# Patient Record
Sex: FEMALE | Race: BLACK OR AFRICAN AMERICAN | NOT HISPANIC OR LATINO | Employment: OTHER | URBAN - METROPOLITAN AREA
[De-identification: names, ages, dates, MRNs, and addresses within clinical notes are randomized per-mention and may not be internally consistent; named-entity substitution may affect disease eponyms.]

---

## 2017-01-26 ENCOUNTER — CLINICAL SUPPORT (OUTPATIENT)
Dept: SMOKING CESSATION | Facility: CLINIC | Age: 77
End: 2017-01-26
Payer: COMMERCIAL

## 2017-01-26 DIAGNOSIS — F17.200 NICOTINE DEPENDENCE: Primary | ICD-10-CM

## 2017-01-26 PROCEDURE — 99404 PREV MED CNSL INDIV APPRX 60: CPT | Mod: S$GLB,,,

## 2017-01-26 RX ORDER — IBUPROFEN 200 MG
1 TABLET ORAL DAILY
Qty: 14 PATCH | Refills: 0 | Status: SHIPPED | OUTPATIENT
Start: 2017-01-26 | End: 2017-02-06 | Stop reason: SDUPTHER

## 2017-01-26 NOTE — Clinical Note
Pt seen at intake today. She currently smokes 20 cigs/day. Discussed tobacco cessation medications of 21 mg nicotine patch QD  Pt started on rate reduction and wait time of 15 min prior to smoking. Will see pt back in office in 1 week. Pt's exhaled carbon monoxide level was 24 ppm as per Smokerlyzer.  Please see Tobacco Cessation Intake Form for patient assessment and recommendations.

## 2017-02-01 ENCOUNTER — CLINICAL SUPPORT (OUTPATIENT)
Dept: SMOKING CESSATION | Facility: CLINIC | Age: 77
End: 2017-02-01
Payer: COMMERCIAL

## 2017-02-01 DIAGNOSIS — F17.200 NICOTINE DEPENDENCE: Primary | ICD-10-CM

## 2017-02-01 PROCEDURE — 99402 PREV MED CNSL INDIV APPRX 30: CPT | Mod: S$GLB,,,

## 2017-02-01 PROCEDURE — 99999 PR PBB SHADOW E&M-EST. PATIENT-LVL I: CPT | Mod: PBBFAC,,,

## 2017-02-01 RX ORDER — DM/P-EPHED/ACETAMINOPH/DOXYLAM 30-7.5/3
2 LIQUID (ML) ORAL
Qty: 168 LOZENGE | Refills: 0 | Status: SHIPPED | OUTPATIENT
Start: 2017-02-01 | End: 2017-02-27 | Stop reason: SDUPTHER

## 2017-02-01 NOTE — PROGRESS NOTES
Individual Follow-Up Form    2/1/2017    Quit Date:     Clinical Status of Patient: Outpatient    Length of Service: 30 minutes    Continuing Medication: yes  Patches    Other Medications: none     Target Symptoms: Withdrawal and medication side effects. The following were  rated moderate (3) to severe (4) on TCRS:  · Moderate (3): none  · Severe (4): none    Comments: Patient seen in office today. Patient remains on 21 mg nicotine patches. Patient has smoked less than 6 cigarettes per day this week and only one today. Exhaled carbon monoxide was 9 ppm per smokerlyzer.  Pt will be on a goal of less than 6 cigarettes per day this week and 2 mg nicotine lozenge PRN (1-2 per hour in place of cigarettes) was added.  Reviewed coping strategies/habitual behavior/relapse prevention with patient. Patient will return in 2 weeks , will phone patient next week.     Diagnosis: F17.200    Next Visit: 2 weeks

## 2017-02-06 ENCOUNTER — CLINICAL SUPPORT (OUTPATIENT)
Dept: SMOKING CESSATION | Facility: CLINIC | Age: 77
End: 2017-02-06
Payer: COMMERCIAL

## 2017-02-06 DIAGNOSIS — F17.200 NICOTINE DEPENDENCE: Primary | ICD-10-CM

## 2017-02-06 PROCEDURE — 99407 BEHAV CHNG SMOKING > 10 MIN: CPT | Mod: S$GLB,,,

## 2017-02-06 RX ORDER — IBUPROFEN 200 MG
1 TABLET ORAL DAILY
Qty: 14 PATCH | Refills: 0 | Status: SHIPPED | OUTPATIENT
Start: 2017-02-06 | End: 2017-02-08 | Stop reason: SDUPTHER

## 2017-02-06 NOTE — PROGRESS NOTES
Phone follow up today. She continues to smoke 5 cigs/day. Pt remains on tobacco cessation medications of 21 mg nicotine patch QD and 2mg nicotine lozenge  PRN (1-2 per hour in place of cigarettes). No adverse effects/mental changes noted at this time. Pt doing well with rate reduction and wait times prior to smoking. Pt asked to reduce current smoking rate by1 cigs/day. Reviewed coping strategies/habitual behavior/relapse prevention with patient.  Will see pt back in office in 1 week. Sent patches script to pharmacy.

## 2017-02-08 ENCOUNTER — CLINICAL SUPPORT (OUTPATIENT)
Dept: SMOKING CESSATION | Facility: CLINIC | Age: 77
End: 2017-02-08
Payer: COMMERCIAL

## 2017-02-08 DIAGNOSIS — F17.200 NICOTINE DEPENDENCE: Primary | ICD-10-CM

## 2017-02-08 PROCEDURE — 99407 BEHAV CHNG SMOKING > 10 MIN: CPT | Mod: S$GLB,,,

## 2017-02-08 RX ORDER — IBUPROFEN 200 MG
1 TABLET ORAL DAILY
Qty: 14 PATCH | Refills: 0 | Status: SHIPPED | OUTPATIENT
Start: 2017-02-08 | End: 2017-02-27 | Stop reason: SDUPTHER

## 2017-02-13 ENCOUNTER — CLINICAL SUPPORT (OUTPATIENT)
Dept: SMOKING CESSATION | Facility: CLINIC | Age: 77
End: 2017-02-13
Payer: COMMERCIAL

## 2017-02-13 DIAGNOSIS — F17.200 NICOTINE DEPENDENCE: Primary | ICD-10-CM

## 2017-02-13 PROCEDURE — 99999 PR PBB SHADOW E&M-EST. PATIENT-LVL I: CPT | Mod: PBBFAC,,,

## 2017-02-13 PROCEDURE — 99402 PREV MED CNSL INDIV APPRX 30: CPT | Mod: S$GLB,,,

## 2017-02-13 NOTE — PROGRESS NOTES
Individual Follow-Up Form    2/13/2017    Quit Date:     Clinical Status of Patient: Outpatient    Length of Service: 30 minutes    Continuing Medication: yes  Patches    Other Medications: lozenge     Target Symptoms: Withdrawal and medication side effects. The following were  rated moderate (3) to severe (4) on TCRS:  · Moderate (3): none  · Severe (4): none    Comments:        Pt seen in office today.       She continues to smoke 4 cigs/day.    Pt remains on tobacco cessation medications of    21  mg nicotine patch QD         2 mg nicotine lozenge    PRN (1-2 per hour in place of cigarettes).                   No adverse effects  or mental changes noted at this time.   Pt doing well with rate reduction and wait times prior to smoking.   Pt asked to reduce current smoking rate by 1 cigs/day.   Reviewed coping strategies/habitual behavior/relapse prevention with patient.   Exhaled carbon monoxide level was   5 per Smokerlyzer.   Will see pt back in office in 1 week .   Congratulated patient on  her success .      Diagnosis: F17.200    Next Visit: 1 week

## 2017-02-27 ENCOUNTER — CLINICAL SUPPORT (OUTPATIENT)
Dept: SMOKING CESSATION | Facility: CLINIC | Age: 77
End: 2017-02-27
Payer: COMMERCIAL

## 2017-02-27 DIAGNOSIS — F17.200 NICOTINE DEPENDENCE: Primary | ICD-10-CM

## 2017-02-27 PROCEDURE — 99407 BEHAV CHNG SMOKING > 10 MIN: CPT | Mod: S$GLB,,,

## 2017-02-27 RX ORDER — IBUPROFEN 200 MG
1 TABLET ORAL DAILY
Qty: 14 PATCH | Refills: 0 | Status: SHIPPED | OUTPATIENT
Start: 2017-02-27 | End: 2017-03-27 | Stop reason: DRUGHIGH

## 2017-02-27 RX ORDER — DM/P-EPHED/ACETAMINOPH/DOXYLAM 30-7.5/3
2 LIQUID (ML) ORAL
Qty: 168 LOZENGE | Refills: 0 | Status: SHIPPED | OUTPATIENT
Start: 2017-02-27 | End: 2023-06-30

## 2017-03-06 ENCOUNTER — CLINICAL SUPPORT (OUTPATIENT)
Dept: SMOKING CESSATION | Facility: CLINIC | Age: 77
End: 2017-03-06
Payer: COMMERCIAL

## 2017-03-06 DIAGNOSIS — F17.200 NICOTINE DEPENDENCE: Primary | ICD-10-CM

## 2017-03-06 PROCEDURE — 99402 PREV MED CNSL INDIV APPRX 30: CPT | Mod: S$GLB,,,

## 2017-03-06 NOTE — Clinical Note
Pt seen in office today.       She continues to smoke 1  cigs/day.    21  mg nicotine patch QD         2 mg nicotine lozenge PRN (1-2 per hour in place of cigarettes).                  No adverse effects  or mental changes noted at this time.  Pt doing well with rate reduction and wait times prior to smoking.  Pt picked a quit day.   Reviewed coping strategies/habitual behavior/relapse prevention with patient.  Exhaled carbon monoxide level was  2  per Smokerlyzer.  Will see pt back in office in 2 weeks.  Congratulated patient on her success .

## 2017-03-06 NOTE — PROGRESS NOTES
Individual Follow-Up Form    3/6/2017    Quit Date:3/27/17  Clinical Status of Patient: Outpatient    Length of Service: 30 minutes    Continuing Medication: yes  Patches    Other Medications: lozenge      Target Symptoms: Withdrawal and medication side effects. The following were  rated moderate (3) to severe (4) on TCRS:  · Moderate (3): none  · Severe (4): none    Comments: Pt seen in office today.        She continues to smoke 1  cigs/day.     21  mg nicotine patch QD          2 mg nicotine lozenge PRN (1-2 per hour in place of cigarettes).                   No adverse effects  or mental changes noted at this time.   Pt doing well with rate reduction and wait times prior to smoking.   Pt picked a quit day.    Reviewed coping strategies/habitual behavior/relapse prevention with patient.   Exhaled carbon monoxide level was  2  per Smokerlyzer.   Will see pt back in office in 2 weeks.  Congratulated patient on her success .    Diagnosis: F17.200    Next Visit: 2 weeks

## 2017-03-27 ENCOUNTER — CLINICAL SUPPORT (OUTPATIENT)
Dept: SMOKING CESSATION | Facility: CLINIC | Age: 77
End: 2017-03-27
Payer: COMMERCIAL

## 2017-03-27 DIAGNOSIS — F17.200 NICOTINE DEPENDENCE: Primary | ICD-10-CM

## 2017-03-27 PROCEDURE — 99999 PR PBB SHADOW E&M-EST. PATIENT-LVL I: CPT | Mod: PBBFAC,,,

## 2017-03-27 PROCEDURE — 99403 PREV MED CNSL INDIV APPRX 45: CPT | Mod: S$GLB,,,

## 2017-03-27 RX ORDER — IBUPROFEN 200 MG
1 TABLET ORAL DAILY
Qty: 28 PATCH | Refills: 0 | COMMUNITY
Start: 2017-03-27 | End: 2017-04-10 | Stop reason: CLARIF

## 2017-03-27 NOTE — PROGRESS NOTES
Individual Follow-Up Form    3/27/2017    Quit Date:     Clinical Status of Patient: Outpatient    Length of Service: 45 minutes    Continuing Medication: yes  Patches    Other Medications: lozenge     Target Symptoms: Withdrawal and medication side effects. The following were  rated moderate (3) to severe (4) on TCRS:  · Moderate (3): none  · Severe (4): none    Comments: Pt seen in office today.       She continues to smoke 3 cigs/day, she increased due to stressors of a recent death in her family. Pt remains on tobacco cessation medications of  14 mg nicotine patch QD  and 2  mg nicotine lozenge PRN (1-2 per hour in place of cigarettes).    No adverse effects  or mental changes noted at this time.  Pt doing well with rate reduction and wait times prior to smoking.   Reviewed coping strategies/habitual behavior/relapse prevention with patient.  Will see pt back in office in 2 weeks  .      Diagnosis: F17.200    Next Visit: 2 weeks

## 2017-04-10 ENCOUNTER — CLINICAL SUPPORT (OUTPATIENT)
Dept: SMOKING CESSATION | Facility: CLINIC | Age: 77
End: 2017-04-10
Payer: COMMERCIAL

## 2017-04-10 DIAGNOSIS — F17.200 NICOTINE DEPENDENCE: Primary | ICD-10-CM

## 2017-04-10 PROCEDURE — 99407 BEHAV CHNG SMOKING > 10 MIN: CPT | Mod: S$GLB,,,

## 2017-04-10 RX ORDER — IBUPROFEN 200 MG
1 TABLET ORAL DAILY
Qty: 28 PATCH | Refills: 0 | Status: SHIPPED | OUTPATIENT
Start: 2017-04-10 | End: 2017-05-29 | Stop reason: SDUPTHER

## 2017-04-26 ENCOUNTER — CLINICAL SUPPORT (OUTPATIENT)
Dept: SMOKING CESSATION | Facility: CLINIC | Age: 77
End: 2017-04-26
Payer: COMMERCIAL

## 2017-04-26 DIAGNOSIS — F17.200 NICOTINE DEPENDENCE: Primary | ICD-10-CM

## 2017-04-26 PROCEDURE — 99403 PREV MED CNSL INDIV APPRX 45: CPT | Mod: S$GLB,,,

## 2017-04-26 NOTE — PROGRESS NOTES
Individual Follow-Up Form    2017    Quit Date:     Clinical Status of Patient: Outpatient    Length of Service: 45 minutes    Continuing Medication: yes  Patches    Other Medications: lozenge   Target Symptoms: Withdrawal and medication side effects. The following were  rated moderate (3) to severe (4) on TCRS:  · Moderate (3): none  · Severe (4): none    Comments: Pt seen in office today. She had a slip  when her nephew  last week. She smoked about 8 cigarettes. Pt remains on tobacco cessation medication of 14 mg nicotine patch QD and 2 mg NRT lozenge. . No adverse effects/mental changes noted at this time. Reviewed the lapse / relapse material with patient . Reviewed coping strategies/habitual behavior/relapse prevention with patient. Exhaled carbon monoxide level was 18 ppm per Smokerlyzer. Will see pt back in office in 2 weeks.      Diagnosis: F17.200    Next Visit: 2 weeks

## 2017-05-24 ENCOUNTER — CLINICAL SUPPORT (OUTPATIENT)
Dept: SMOKING CESSATION | Facility: CLINIC | Age: 77
End: 2017-05-24
Payer: COMMERCIAL

## 2017-05-24 DIAGNOSIS — F17.200 NICOTINE DEPENDENCE: Primary | ICD-10-CM

## 2017-05-24 PROCEDURE — 99403 PREV MED CNSL INDIV APPRX 45: CPT | Mod: S$GLB,,,

## 2017-05-24 NOTE — PROGRESS NOTES
Individual Follow-Up Form    5/24/2017    Quit Date:     Clinical Status of Patient: Outpatient    Length of Service: 45 minutes    Continuing Medication: yes  Patches    Other Medications: lozenge     Target Symptoms: Withdrawal and medication side effects. The following were  rated moderate (3) to severe (4) on TCRS:  · Moderate (3): none  · Severe (4): none    Comments: Pt seen in office today. She continues to smoke an occasional cigarette when under stress , never more than one a day.. Pt remains on tobacco cessation medication of  14 mg nicotine patch QD and 2 mg nicotine lozenge PRN (1-2 per hour in place of cigarettes). No adverse effects/mental changes noted at this time. Pt advised to just not have any cigarettes available and reviewed different ways to handle stress without lighting up a cigarette. She is eager to be tobacco free again.  Reviewed coping strategies/habitual behavior/relapse prevention with patient. Exhaled carbon monoxide level was 3 ppm per Smokerlyzer  ( non- smoker = 0-5 ppm .)      Diagnosis: F17.200    Next Visit: 2 weeks

## 2017-05-29 ENCOUNTER — CLINICAL SUPPORT (OUTPATIENT)
Dept: SMOKING CESSATION | Facility: CLINIC | Age: 77
End: 2017-05-29
Payer: COMMERCIAL

## 2017-05-29 DIAGNOSIS — F17.200 NICOTINE DEPENDENCE: Primary | ICD-10-CM

## 2017-05-29 PROCEDURE — 99402 PREV MED CNSL INDIV APPRX 30: CPT | Mod: S$GLB,,,

## 2017-05-29 RX ORDER — IBUPROFEN 200 MG
1 TABLET ORAL DAILY
Qty: 14 PATCH | Refills: 0 | Status: SHIPPED | OUTPATIENT
Start: 2017-05-29 | End: 2017-06-12 | Stop reason: SDUPTHER

## 2017-06-12 ENCOUNTER — CLINICAL SUPPORT (OUTPATIENT)
Dept: SMOKING CESSATION | Facility: CLINIC | Age: 77
End: 2017-06-12
Payer: COMMERCIAL

## 2017-06-12 DIAGNOSIS — F17.200 NICOTINE DEPENDENCE: Primary | ICD-10-CM

## 2017-06-12 PROCEDURE — 99407 BEHAV CHNG SMOKING > 10 MIN: CPT | Mod: S$GLB,,,

## 2017-06-12 RX ORDER — IBUPROFEN 200 MG
1 TABLET ORAL DAILY
Qty: 14 PATCH | Refills: 0 | Status: SHIPPED | OUTPATIENT
Start: 2017-06-12 | End: 2017-09-11 | Stop reason: SDUPTHER

## 2017-06-26 ENCOUNTER — CLINICAL SUPPORT (OUTPATIENT)
Dept: SMOKING CESSATION | Facility: CLINIC | Age: 77
End: 2017-06-26
Payer: COMMERCIAL

## 2017-06-26 DIAGNOSIS — F17.200 NICOTINE DEPENDENCE: Primary | ICD-10-CM

## 2017-06-26 PROCEDURE — 99403 PREV MED CNSL INDIV APPRX 45: CPT | Mod: S$GLB,,,

## 2017-06-27 NOTE — PROGRESS NOTES
Individual Follow-Up Form    6/27/2017    Quit Date: 6/24/17    Clinical Status of Patient: Outpatient    Length of Service: 45 minutes    Continuing Medication: yes  Patches    Other Medications:      Target Symptoms: Withdrawal and medication side effects. The following were  rated moderate (3) to severe (4) on TCRS:  · Moderate (3): none  · Severe (4): none    Comments: Pt seen in office today. Patient is tobacco free since 6/24/17. Pt remains on tobacco cessation medication of 14 mg nicotine patch QD No adverse effects/mental changes noted at this time. Reviewed coping strategies/habitual behavior/relapse prevention with patient. Exhaled carbon monoxide level was 6 ppm per Smokerlyzer (non- smoker = 0-5 ppm.) Will see pt back in office in 2 weeks.        Diagnosis: F17.200    Next Visit: 2 weeks

## 2017-06-28 ENCOUNTER — TELEPHONE (OUTPATIENT)
Dept: SMOKING CESSATION | Facility: CLINIC | Age: 77
End: 2017-06-28

## 2017-07-11 ENCOUNTER — TELEPHONE (OUTPATIENT)
Dept: SMOKING CESSATION | Facility: CLINIC | Age: 77
End: 2017-07-11

## 2017-07-12 ENCOUNTER — CLINICAL SUPPORT (OUTPATIENT)
Dept: SMOKING CESSATION | Facility: CLINIC | Age: 77
End: 2017-07-12
Payer: COMMERCIAL

## 2017-07-12 DIAGNOSIS — F17.200 NICOTINE DEPENDENCE: Primary | ICD-10-CM

## 2017-07-12 PROCEDURE — 99407 BEHAV CHNG SMOKING > 10 MIN: CPT | Mod: S$GLB,,,

## 2017-07-17 ENCOUNTER — CLINICAL SUPPORT (OUTPATIENT)
Dept: SMOKING CESSATION | Facility: CLINIC | Age: 77
End: 2017-07-17
Payer: COMMERCIAL

## 2017-07-17 DIAGNOSIS — F17.200 NICOTINE DEPENDENCE: Primary | ICD-10-CM

## 2017-07-17 PROCEDURE — 99407 BEHAV CHNG SMOKING > 10 MIN: CPT | Mod: S$GLB,,,

## 2017-08-07 ENCOUNTER — CLINICAL SUPPORT (OUTPATIENT)
Dept: SMOKING CESSATION | Facility: CLINIC | Age: 77
End: 2017-08-07
Payer: COMMERCIAL

## 2017-08-07 DIAGNOSIS — F17.200 NICOTINE DEPENDENCE: Primary | Chronic | ICD-10-CM

## 2017-08-07 PROCEDURE — 99407 BEHAV CHNG SMOKING > 10 MIN: CPT | Mod: S$GLB,,,

## 2017-09-11 ENCOUNTER — CLINICAL SUPPORT (OUTPATIENT)
Dept: SMOKING CESSATION | Facility: CLINIC | Age: 77
End: 2017-09-11
Payer: COMMERCIAL

## 2017-09-11 DIAGNOSIS — F17.200 NICOTINE DEPENDENCE: ICD-10-CM

## 2017-09-11 PROCEDURE — 99407 BEHAV CHNG SMOKING > 10 MIN: CPT | Mod: S$GLB,,,

## 2017-09-11 RX ORDER — IBUPROFEN 200 MG
1 TABLET ORAL DAILY
Qty: 28 PATCH | Refills: 0 | Status: SHIPPED | OUTPATIENT
Start: 2017-09-11 | End: 2017-10-16 | Stop reason: SDUPTHER

## 2017-09-18 ENCOUNTER — CLINICAL SUPPORT (OUTPATIENT)
Dept: SMOKING CESSATION | Facility: CLINIC | Age: 77
End: 2017-09-18
Payer: COMMERCIAL

## 2017-09-18 DIAGNOSIS — F17.200 NICOTINE DEPENDENCE: Primary | ICD-10-CM

## 2017-09-18 PROCEDURE — 99999 PR PBB SHADOW E&M-EST. PATIENT-LVL I: CPT | Mod: PBBFAC,,,

## 2017-09-18 PROCEDURE — 99402 PREV MED CNSL INDIV APPRX 30: CPT | Mod: S$GLB,,,

## 2017-09-18 NOTE — PROGRESS NOTES
Individual Follow-Up Form    9/18/2017    Quit Date:     Clinical Status of Patient: Outpatient    Length of Service: 30 minutes    Continuing Medication: yes  Patches    Other Medications: none     Target Symptoms: Withdrawal and medication side effects. The following were  rated moderate (3) to severe (4) on TCRS:  · Moderate (3): none  · Severe (4): none    Comments: Pt continues to smoke 3 cigs/day. She said she had several deaths in her family and she bought some cigarettes.  Pt remains on tobacco cessation medication of 14 mg nicotine patch QD  No adverse effects noted at this time. Reviewed coping strategies/habitual behavior/relapse prevention with patient. Patient has my card and number and states she will make an appointment later because she is not ready today. She knows she can call me anytime.        Diagnosis: F17.200    Next Visit: 1 week

## 2017-10-16 DIAGNOSIS — F17.200 NICOTINE DEPENDENCE: ICD-10-CM

## 2017-10-16 RX ORDER — IBUPROFEN 200 MG
1 TABLET ORAL DAILY
Qty: 28 PATCH | Refills: 0 | Status: SHIPPED | OUTPATIENT
Start: 2017-10-16 | End: 2017-11-13

## 2018-01-25 ENCOUNTER — TELEPHONE (OUTPATIENT)
Dept: SMOKING CESSATION | Facility: CLINIC | Age: 78
End: 2018-01-25

## 2018-01-26 ENCOUNTER — TELEPHONE (OUTPATIENT)
Dept: SMOKING CESSATION | Facility: CLINIC | Age: 78
End: 2018-01-26

## 2018-01-30 ENCOUNTER — TELEPHONE (OUTPATIENT)
Dept: SMOKING CESSATION | Facility: CLINIC | Age: 78
End: 2018-01-30

## 2019-09-09 ENCOUNTER — HOSPITAL ENCOUNTER (OUTPATIENT)
Dept: RADIOLOGY | Facility: HOSPITAL | Age: 79
Discharge: HOME | End: 2019-09-09
Attending: FAMILY MEDICINE
Payer: MEDICARE

## 2019-09-09 DIAGNOSIS — M25.519 SHOULDER PAIN: Primary | ICD-10-CM

## 2019-09-09 DIAGNOSIS — M25.519 SHOULDER PAIN: ICD-10-CM

## 2019-09-09 PROCEDURE — 73030 X-RAY EXAM OF SHOULDER: CPT | Mod: TC,FY,LT

## 2019-09-09 PROCEDURE — 73030 X-RAY EXAM OF SHOULDER: CPT | Mod: 26,LT,, | Performed by: RADIOLOGY

## 2019-09-09 PROCEDURE — 73030 XR SHOULDER COMPLETE 2 OR MORE VIEWS LEFT: ICD-10-PCS | Mod: 26,LT,, | Performed by: RADIOLOGY

## 2019-10-22 RX ORDER — BENAZEPRIL HYDROCHLORIDE 40 MG/1
TABLET ORAL
Qty: 90 TABLET | Refills: 0 | OUTPATIENT
Start: 2019-10-22

## 2020-02-13 ENCOUNTER — HOSPITAL ENCOUNTER (OUTPATIENT)
Dept: RADIOLOGY | Facility: HOSPITAL | Age: 80
Discharge: HOME | End: 2020-02-13
Attending: PAIN MEDICINE
Payer: MEDICARE

## 2020-02-13 DIAGNOSIS — M47.896 OTHER SPONDYLOSIS, LUMBAR REGION: ICD-10-CM

## 2020-02-13 DIAGNOSIS — M47.896 OTHER SPONDYLOSIS, LUMBAR REGION: Primary | ICD-10-CM

## 2020-02-13 PROCEDURE — 72100 X-RAY EXAM L-S SPINE 2/3 VWS: CPT | Mod: TC,FY

## 2020-02-13 PROCEDURE — 72100 XR LUMBAR SPINE AP AND LATERAL: ICD-10-PCS | Mod: 26,,, | Performed by: RADIOLOGY

## 2020-02-13 PROCEDURE — 72100 X-RAY EXAM L-S SPINE 2/3 VWS: CPT | Mod: 26,,, | Performed by: RADIOLOGY

## 2020-06-19 ENCOUNTER — HOSPITAL ENCOUNTER (OUTPATIENT)
Dept: RADIOLOGY | Facility: HOSPITAL | Age: 80
Discharge: HOME | End: 2020-06-19
Attending: PAIN MEDICINE
Payer: MEDICARE

## 2020-06-19 DIAGNOSIS — M25.512 LEFT SHOULDER PAIN: ICD-10-CM

## 2020-06-19 DIAGNOSIS — M25.512 LEFT SHOULDER PAIN: Primary | ICD-10-CM

## 2020-06-19 PROCEDURE — 73030 XR SHOULDER COMPLETE 2 OR MORE VIEWS LEFT: ICD-10-PCS | Mod: 26,LT,, | Performed by: RADIOLOGY

## 2020-06-19 PROCEDURE — 73030 X-RAY EXAM OF SHOULDER: CPT | Mod: TC,FY,LT

## 2020-06-19 PROCEDURE — 73030 X-RAY EXAM OF SHOULDER: CPT | Mod: 26,LT,, | Performed by: RADIOLOGY

## 2021-07-09 ENCOUNTER — HOSPITAL ENCOUNTER (OUTPATIENT)
Dept: RADIOLOGY | Facility: HOSPITAL | Age: 81
Discharge: HOME | End: 2021-07-09
Attending: PAIN MEDICINE
Payer: MEDICARE

## 2021-07-09 DIAGNOSIS — M25.551 BILATERAL HIP PAIN: ICD-10-CM

## 2021-07-09 DIAGNOSIS — M25.552 BILATERAL HIP PAIN: Primary | ICD-10-CM

## 2021-07-09 DIAGNOSIS — M25.552 BILATERAL HIP PAIN: ICD-10-CM

## 2021-07-09 DIAGNOSIS — M25.551 BILATERAL HIP PAIN: Primary | ICD-10-CM

## 2021-07-09 PROCEDURE — 73521 XR HIPS BILATERAL 2 VIEW INCL AP PELVIS: ICD-10-PCS | Mod: 26,,, | Performed by: RADIOLOGY

## 2021-07-09 PROCEDURE — 73521 X-RAY EXAM HIPS BI 2 VIEWS: CPT | Mod: 26,,, | Performed by: RADIOLOGY

## 2021-07-09 PROCEDURE — 73521 X-RAY EXAM HIPS BI 2 VIEWS: CPT | Mod: TC,FY

## 2021-08-09 ENCOUNTER — LAB VISIT (OUTPATIENT)
Dept: PRIMARY CARE CLINIC | Facility: CLINIC | Age: 81
End: 2021-08-09
Payer: MEDICARE

## 2021-08-09 DIAGNOSIS — Z20.822 ENCOUNTER FOR LABORATORY TESTING FOR COVID-19 VIRUS: ICD-10-CM

## 2021-08-09 PROCEDURE — U0005 INFEC AGEN DETEC AMPLI PROBE: HCPCS | Performed by: INTERNAL MEDICINE

## 2021-08-09 PROCEDURE — U0003 INFECTIOUS AGENT DETECTION BY NUCLEIC ACID (DNA OR RNA); SEVERE ACUTE RESPIRATORY SYNDROME CORONAVIRUS 2 (SARS-COV-2) (CORONAVIRUS DISEASE [COVID-19]), AMPLIFIED PROBE TECHNIQUE, MAKING USE OF HIGH THROUGHPUT TECHNOLOGIES AS DESCRIBED BY CMS-2020-01-R: HCPCS | Performed by: INTERNAL MEDICINE

## 2021-08-10 LAB
SARS-COV-2 RNA RESP QL NAA+PROBE: NOT DETECTED
SARS-COV-2- CYCLE NUMBER: -1

## 2021-10-18 ENCOUNTER — HOSPITAL ENCOUNTER (EMERGENCY)
Facility: HOSPITAL | Age: 81
Discharge: HOME | End: 2021-10-18
Attending: EMERGENCY MEDICINE
Payer: MEDICARE

## 2021-10-18 VITALS
SYSTOLIC BLOOD PRESSURE: 100 MMHG | OXYGEN SATURATION: 99 % | RESPIRATION RATE: 19 BRPM | BODY MASS INDEX: 17.48 KG/M2 | DIASTOLIC BLOOD PRESSURE: 50 MMHG | TEMPERATURE: 98 F | WEIGHT: 95 LBS | HEIGHT: 62 IN | HEART RATE: 78 BPM

## 2021-10-18 DIAGNOSIS — M16.12 OSTEOARTHRITIS OF LEFT HIP, UNSPECIFIED OSTEOARTHRITIS TYPE: Primary | ICD-10-CM

## 2021-10-18 DIAGNOSIS — M25.552 LEFT HIP PAIN: ICD-10-CM

## 2021-10-18 DIAGNOSIS — M54.16 LUMBAR RADICULOPATHY: ICD-10-CM

## 2021-10-18 LAB — POCT GLUCOSE: 117 MG/DL (ref 70–110)

## 2021-10-18 PROCEDURE — 99284 EMERGENCY DEPT VISIT MOD MDM: CPT | Mod: 25

## 2021-10-18 PROCEDURE — 63600175 PHARM REV CODE 636 W HCPCS: Performed by: PHYSICIAN ASSISTANT

## 2021-10-18 PROCEDURE — 96372 THER/PROPH/DIAG INJ SC/IM: CPT

## 2021-10-18 RX ORDER — PREDNISONE 20 MG/1
60 TABLET ORAL DAILY
Qty: 9 TABLET | Refills: 0 | Status: SHIPPED | OUTPATIENT
Start: 2021-10-18 | End: 2021-10-21

## 2021-10-18 RX ORDER — KETOROLAC TROMETHAMINE 30 MG/ML
15 INJECTION, SOLUTION INTRAMUSCULAR; INTRAVENOUS
Status: COMPLETED | OUTPATIENT
Start: 2021-10-18 | End: 2021-10-18

## 2021-10-18 RX ORDER — MELOXICAM 7.5 MG/1
7.5 TABLET ORAL DAILY
Qty: 12 TABLET | Refills: 0 | Status: SHIPPED | OUTPATIENT
Start: 2021-10-18

## 2021-10-18 RX ORDER — NAPROXEN 250 MG/1
500 TABLET ORAL 2 TIMES DAILY WITH MEALS
COMMUNITY

## 2021-10-18 RX ORDER — TRAMADOL HYDROCHLORIDE 50 MG/1
50 TABLET ORAL EVERY 6 HOURS PRN
COMMUNITY

## 2021-10-18 RX ORDER — PREDNISONE 20 MG/1
60 TABLET ORAL
Status: COMPLETED | OUTPATIENT
Start: 2021-10-18 | End: 2021-10-18

## 2021-10-18 RX ADMIN — KETOROLAC TROMETHAMINE 15 MG: 30 INJECTION, SOLUTION INTRAMUSCULAR at 01:10

## 2021-10-18 RX ADMIN — PREDNISONE 60 MG: 20 TABLET ORAL at 01:10

## 2022-01-18 PROCEDURE — 96372 THER/PROPH/DIAG INJ SC/IM: CPT

## 2022-01-18 PROCEDURE — 99284 EMERGENCY DEPT VISIT MOD MDM: CPT | Mod: 25

## 2022-01-19 ENCOUNTER — HOSPITAL ENCOUNTER (EMERGENCY)
Facility: HOSPITAL | Age: 82
Discharge: HOME | End: 2022-01-19
Attending: EMERGENCY MEDICINE
Payer: MEDICARE

## 2022-01-19 VITALS
TEMPERATURE: 98 F | OXYGEN SATURATION: 98 % | WEIGHT: 105 LBS | HEART RATE: 84 BPM | HEIGHT: 62 IN | DIASTOLIC BLOOD PRESSURE: 59 MMHG | SYSTOLIC BLOOD PRESSURE: 126 MMHG | BODY MASS INDEX: 19.32 KG/M2 | RESPIRATION RATE: 18 BRPM

## 2022-01-19 DIAGNOSIS — M54.50 CHRONIC RIGHT-SIDED LOW BACK PAIN WITHOUT SCIATICA: Primary | ICD-10-CM

## 2022-01-19 DIAGNOSIS — G89.29 CHRONIC RIGHT-SIDED LOW BACK PAIN WITHOUT SCIATICA: Primary | ICD-10-CM

## 2022-01-19 PROCEDURE — 63600175 PHARM REV CODE 636 W HCPCS: Performed by: PHYSICIAN ASSISTANT

## 2022-01-19 RX ORDER — DEXAMETHASONE SODIUM PHOSPHATE 4 MG/ML
6 INJECTION, SOLUTION INTRA-ARTICULAR; INTRALESIONAL; INTRAMUSCULAR; INTRAVENOUS; SOFT TISSUE
Status: COMPLETED | OUTPATIENT
Start: 2022-01-19 | End: 2022-01-19

## 2022-01-19 RX ORDER — KETOROLAC TROMETHAMINE 30 MG/ML
15 INJECTION, SOLUTION INTRAMUSCULAR; INTRAVENOUS
Status: COMPLETED | OUTPATIENT
Start: 2022-01-19 | End: 2022-01-19

## 2022-01-19 RX ADMIN — KETOROLAC TROMETHAMINE 15 MG: 30 INJECTION, SOLUTION INTRAMUSCULAR; INTRAVENOUS at 02:01

## 2022-01-19 RX ADMIN — DEXAMETHASONE SODIUM PHOSPHATE 6 MG: 4 INJECTION INTRA-ARTICULAR; INTRALESIONAL; INTRAMUSCULAR; INTRAVENOUS; SOFT TISSUE at 02:01

## 2022-01-19 NOTE — ED PROVIDER NOTES
Encounter Date: 1/18/2022       History     Chief Complaint   Patient presents with    Back Pain     Pt states that she started to have non traumatic lower back pain around 1800 today, no pain with urination.      80yo F with pmh NIDDM, HTN, hx tobacco abuse, COPD, vit D def, bilateral hip osteoarthritis, osteoarthritis lumbar spine, adult failure to thrive, with chief complaint R low back pain x today.    No trauma. No recent injury. Hx similar pain but typically alleviated with ultram; no relief with ultram today. No leg weakness.  No bowel or bladder incontinence or retention.  Denies any radiation down her posterior thigh.  No paresthesia.  Pain is constant, worsened with rising from seated position, ambulation, certain motions of her lumbar spine.  Pain somewhat alleviated with immobility.  No history of any epidural spinal injections.  Reason follow-up with Neurosurgery, planned physical therapy rather than surgical intervention at this time.      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        Review of patient's allergies indicates:  No Known Allergies  Past Medical History:   Diagnosis Date    Diabetes mellitus     Diabetes mellitus, type 2     Hypertension     Thyroid activity decreased      Past Surgical History:   Procedure Laterality Date    HYSTERECTOMY       History reviewed. No pertinent family history.  Social History     Tobacco Use    Smoking status: Current Some Day Smoker     Types: Cigarettes    Smokeless tobacco: Never Used   Substance Use Topics    Alcohol use: Never    Drug use: Never     Review of Systems   Constitutional: Negative for fever.   Gastrointestinal: Negative for abdominal pain.   Genitourinary: Negative for difficulty urinating.   Musculoskeletal: Positive for back pain and gait problem.   Skin: Negative for rash and wound.    Neurological: Negative for weakness.       Physical Exam     Initial Vitals [01/18/22 2319]   BP Pulse Resp Temp SpO2   137/63 88 18 98.1 °F (36.7 °C) 98 %      MAP       --         Physical Exam    Nursing note and vitals reviewed.  Constitutional: She appears well-developed and well-nourished. She is not diaphoretic. No distress.   Uncomfortable appearing, nontoxic.  Sitting upright on exam table.  Ambulates with slow, mildly antalgic gait, unassisted.   HENT:   Head: Normocephalic and atraumatic.   Neck: Neck supple.   Normal range of motion.  Cardiovascular: Intact distal pulses.   1+DP bilaterally   Pulmonary/Chest: No respiratory distress.   Musculoskeletal:      Cervical back: Normal range of motion and neck supple.      Comments: No midline spinal ttp. There is R sided low back soft tissue tenderness posterior to pelvis. +R SLR at approx 20°, +L SLR at 45°.  No significant pain with passive bilateral hip range of motion.     Neurological: She is alert and oriented to person, place, and time. GCS score is 15. GCS eye subscore is 4. GCS verbal subscore is 5. GCS motor subscore is 6.   2/5 hip flexion strength bilaterally  2/5 knee flexion strength bilaterally  2/5 knee extension strength bilaterally  4/5 ankle plantarflexion strength bilaterally   Skin: Skin is warm. Capillary refill takes less than 2 seconds.   Psychiatric: She has a normal mood and affect. Thought content normal.         ED Course   Procedures  Labs Reviewed - No data to display       Imaging Results    None          Medications   dexamethasone injection 6 mg (6 mg Intramuscular Given 1/19/22 0230)   ketorolac injection 15 mg (15 mg Intramuscular Given 1/19/22 0230)     Medical Decision Making:   Differential Diagnosis:   Cauda equina, lumbar radiculopathy, strain/sprain  ED Management:  No significant postvoid residual. No GI/ issues, no bowel or bladder incontinence or retention. No saddle anesthesia. No trauma. No paresthesia.  Has  had similar low back pain in the past.  Pain is reproducible with palpation. She is able to tolerate weight-bearing with mildly antalgic gait.  Doubt cauda equina or cord compression. No fever, no weight loss, no recent infection, no IV drug use. Doubt epidural abscess. No midline spinal ttp. No focal weakness. No ripping/tearing sensation. Neurovascularly intact distally.  Overall, I have low suspicion for emergent process at this time.  Spoke with patient and daughter at length, they will follow up with Neurosurgery as an outpatient.  Red flags discussed.  They understand and feel comfortable with discharge.                      Clinical Impression:   Final diagnoses:  [M54.50, G89.29] Chronic right-sided low back pain without sciatica (Primary)          ED Disposition Condition    Discharge Stable        ED Prescriptions     None        Follow-up Information     Follow up With Specialties Details Why Contact Info    [facility]  Schedule an appointment as soon as possible for a visit  For reevaluation [address] [place] [address] [address]vd    [id number]    [place] [address]    [phone]           Rodney Jorge PA-C  01/19/22 0659

## 2022-01-19 NOTE — DISCHARGE INSTRUCTIONS
Continue with Ultram as needed for pain.  Please follow-up with neurosurgery for re-evaluation.    Return to this ED if she begins with leg weakness, any trouble with urination or bowel movements, if she begins with any numbness to her groin or legs, if unable to walk or bear weight, if any other problems occur.

## 2022-01-25 ENCOUNTER — HOSPITAL ENCOUNTER (EMERGENCY)
Facility: HOSPITAL | Age: 82
Discharge: HOME | End: 2022-01-25
Attending: EMERGENCY MEDICINE
Payer: MEDICARE

## 2022-01-25 VITALS
HEART RATE: 9 BPM | RESPIRATION RATE: 16 BRPM | SYSTOLIC BLOOD PRESSURE: 127 MMHG | DIASTOLIC BLOOD PRESSURE: 81 MMHG | HEIGHT: 62 IN | OXYGEN SATURATION: 99 % | WEIGHT: 104 LBS | BODY MASS INDEX: 19.14 KG/M2 | TEMPERATURE: 98 F

## 2022-01-25 DIAGNOSIS — M25.519 SHOULDER PAIN, UNSPECIFIED CHRONICITY, UNSPECIFIED LATERALITY: Primary | ICD-10-CM

## 2022-01-25 PROCEDURE — 96372 THER/PROPH/DIAG INJ SC/IM: CPT

## 2022-01-25 PROCEDURE — 63600175 PHARM REV CODE 636 W HCPCS: Performed by: NURSE PRACTITIONER

## 2022-01-25 PROCEDURE — 99284 EMERGENCY DEPT VISIT MOD MDM: CPT | Mod: 25

## 2022-01-25 RX ORDER — KETOROLAC TROMETHAMINE 30 MG/ML
15 INJECTION, SOLUTION INTRAMUSCULAR; INTRAVENOUS
Status: COMPLETED | OUTPATIENT
Start: 2022-01-25 | End: 2022-01-25

## 2022-01-25 RX ORDER — DEXAMETHASONE SODIUM PHOSPHATE 4 MG/ML
4 INJECTION, SOLUTION INTRA-ARTICULAR; INTRALESIONAL; INTRAMUSCULAR; INTRAVENOUS; SOFT TISSUE
Status: COMPLETED | OUTPATIENT
Start: 2022-01-25 | End: 2022-01-25

## 2022-01-25 RX ADMIN — KETOROLAC TROMETHAMINE 15 MG: 30 INJECTION, SOLUTION INTRAMUSCULAR at 12:01

## 2022-01-25 RX ADMIN — DEXAMETHASONE SODIUM PHOSPHATE 4 MG: 4 INJECTION INTRA-ARTICULAR; INTRALESIONAL; INTRAMUSCULAR; INTRAVENOUS; SOFT TISSUE at 12:01

## 2022-01-25 NOTE — ED PROVIDER NOTES
Encounter Date: 1/25/2022    SCRIBE #1 NOTE: I, Karen Miller, am scribing for, and in the presence of,  Eleanor Ansari NP. I have scribed the following portions of the note - Other sections scribed: HPI, ROS, PE.       History     Chief Complaint   Patient presents with    Shoulder Pain     Pt reports left shoulder pain x 2 days, reports hx of arthritis. Reports tramadol has not been helping.     This 81 y.o female, with a medical history of Arthritis, Diabetes mellitus type 2, Hypertension, and Decreased thyroid activity, presents to the ED c/o severe (10/10) left shoulder pain that began yesterday. Pt reports that she has a history of arthritis, bone spurs, cysts and cartilage issues to the left shoulder. She states that she is followed by Pain Management and is currently on Tramadol for treatment. She adds that she also receives steroid injections every 3-4 months. Pt notes that her next injection is scheduled for 2/10/22. No recent falls or activity. No other associated symptoms. No alleviating factors.    The history is provided by the patient.     Review of patient's allergies indicates:  No Known Allergies  Past Medical History:   Diagnosis Date    Arthritis     Diabetes mellitus     Diabetes mellitus, type 2     Hypertension     Thyroid activity decreased      Past Surgical History:   Procedure Laterality Date    HYSTERECTOMY       History reviewed. No pertinent family history.  Social History     Tobacco Use    Smoking status: Current Some Day Smoker     Types: Cigarettes    Smokeless tobacco: Never Used   Substance Use Topics    Alcohol use: Never    Drug use: Never     Review of Systems   Constitutional: Negative for fever.   HENT: Negative for sore throat.    Respiratory: Negative for shortness of breath.    Cardiovascular: Negative for chest pain.   Gastrointestinal: Negative for nausea.   Genitourinary: Negative for dysuria.   Musculoskeletal: Positive for arthralgias (left shoulder  pain). Negative for back pain.   Skin: Negative for rash.   Neurological: Negative for weakness.       Physical Exam     Initial Vitals [01/25/22 1109]   BP Pulse Resp Temp SpO2   124/61 98 16 98.5 °F (36.9 °C) 99 %      MAP       --         Physical Exam    Nursing note and vitals reviewed.  Constitutional: She appears well-developed.   Thin, chronically ill-appearing female   HENT:   Head: Normocephalic and atraumatic.   Eyes: Conjunctivae are normal.   Neck: Neck supple.   Normal range of motion.  Pulmonary/Chest: No respiratory distress.   Abdominal: Abdomen is soft.   Musculoskeletal:         General: Tenderness present.      Cervical back: Normal range of motion and neck supple.      Comments: Tenderness over the clavicle and humeral head.  Pain with active and passive range of motion.  Range of motion decreased secondary to the pain     Neurological: She is alert and oriented to person, place, and time. No sensory deficit. GCS score is 15. GCS eye subscore is 4. GCS verbal subscore is 5. GCS motor subscore is 6.   Skin: Skin is warm and dry. Capillary refill takes less than 2 seconds.   Psychiatric: She has a normal mood and affect.         ED Course   Procedures  Labs Reviewed - No data to display       Imaging Results    None          Medications   ketorolac injection 15 mg (15 mg Intramuscular Given 1/25/22 1257)   dexamethasone injection 4 mg (4 mg Intramuscular Given 1/25/22 1257)     Medical Decision Making:   Differential Diagnosis:   Chronic pain exacerbation, septic joint, fear complaint  ED Management:  Diagnosis management comments: This is an urgent evaluation of a 81-year-old female that presented to the ER with c/o left shoulder pain that her regular pain medicine isn't helping. Pts exam was as above.     Patient is followed by pain management and often has cortisone injections for her shoulder.  She states she has not had 1 in over 4 months.  She is taking tramadol without improvement at this  time.  There is no new trauma.  Patient's previous x-rays reviewed:  There is probable avascular necrosis present.  I believe this patient is baseline for her chronic pain.  I will give her an injection of Toradol and dexamethasone in the emergency department being she states that usually helps her acute pain.  I have increased her to follow back up with pain management and to take Tylenol 1 g every 6 hours to help with her discomfort.    Based on exam today - I have low suspicion for medical, surgical or other life threatening condition and I believe pt is safe for discharge and outpatient f/u.    Pt verbalizes understanding of d/c instructions and will return for worsening condition.              Scribe Attestation:   Scribe #1: I performed the above scribed service and the documentation accurately describes the services I performed. I attest to the accuracy of the note.                 Clinical Impression:   Final diagnoses:  [M25.519] Shoulder pain, unspecified chronicity, unspecified laterality (Primary)          ED Disposition Condition    Discharge Stable        ED Prescriptions     None        Follow-up Information     Follow up With Specialties Details Why Contact [facility] Emergency Dept Emergency Medicine  If symptoms worsen or any other concerns [facility] Yajaira Nelson [facility] [address] [phone]    Mark Colvin Jr., MD Family Medicine Schedule an appointment as soon as possible for a visit   [address]  [address] [facility] [address] [phone]          I, Eleanor Coello NP-C  , personally performed the services described in this documentation. All medical record entries made by the scribe were at my direction and in my presence. I have reviewed the chart and agree that the record reflects my personal performance and is accurate and complete.     Eleanor Coello NP  01/25/22 6166

## 2022-01-25 NOTE — FIRST PROVIDER EVALUATION
" Emergency Department TeleTriage Encounter Note      CHIEF COMPLAINT    Chief Complaint   Patient presents with    Shoulder Pain     Pt reports left shoulder pain x 2 days, reports hx of arthritis. Reports tramadol has not been helping.       VITAL SIGNS   Initial Vitals [01/25/22 1109]   BP Pulse Resp Temp SpO2   124/61 98 16 98.5 °F (36.9 °C) 99 %      MAP       --            ALLERGIES    Review of patient's allergies indicates:  No Known Allergies    PROVIDER TRIAGE NOTE  Left shoulder pain x2 days.  No injuries to shoulder.  Reports she has a bones spur and cysts. Xray from 6/19/2020 shows "Severe degenerative changes of the glenohumeral joint with probable associated avascular necrosis."      ORDERS  Labs Reviewed - No data to display    ED Orders (720h ago, onward)    None            Virtual Visit Note: The provider triage portion of this emergency department evaluation and documentation was performed via [employer], a HIPAA-compliant telemedicine application, in concert with a tele-presenter in the room. A face to face patient evaluation with one of my colleagues will occur once the patient is placed in an emergency department room.      DISCLAIMER: This note was prepared with [employer] voice recognition transcription software. Garbled syntax, mangled pronouns, and other bizarre constructions may be attributed to that software system.  "

## 2022-08-10 NOTE — TELEPHONE ENCOUNTER
First attempt left message regarding smoking cessation quit 1 episode.     Z Plasty Text: The lesion was extirpated to the level of the fat with a #15 scalpel blade.  Given the location of the defect, shape of the defect and the proximity to free margins a Z-plasty was deemed most appropriate for repair.  Using a sterile surgical marker, the appropriate transposition arms of the Z-plasty were drawn incorporating the defect and placing the expected incisions within the relaxed skin tension lines where possible.    The area thus outlined was incised deep to adipose tissue with a #15 scalpel blade.  The skin margins were undermined to an appropriate distance in all directions utilizing iris scissors.  The opposing transposition arms were then transposed into place in opposite direction and anchored with interrupted buried subcutaneous sutures.

## 2023-01-01 NOTE — PROGRESS NOTES
Phone follow up today. She is attempting her quit today, so far she said she is doing well . Pt remains on tobacco cessation medications of 21 mg nicotine patch QD and 2 nicotine lozenge PRN (1-2 per hour in place of cigarettes). No adverse effects/mental changes noted at this time. Pt doing well with rate reduction and wait times prior to smoking. Pt asked to reduce current smoking rate by 2 cigs/day. Reviewed coping strategies/habitual behavior/relapse prevention with patient.  Will see pt back in office in 1 week. Congratulated patient on her  success .       2023 08:52  7lb 7oz  at 37wk  NICU for respiratory distress and shoulder dystocia 4d female born via  at 37wk on 23 at 8:52AM presents with jaundice. Pt had brief NICU stay for respiratory distress and shoulder dystocia. She required 7hr phototherapy prior to discharge. She was evaluated by her pediatrician today and referred to ED for increased jaundice. She is receiving formula and breast milk. Mom reports 8 diapers today (wet, stool, and mixed). No other complaints.

## 2023-06-22 NOTE — PROGRESS NOTES
Individual Follow-Up Form    5/29/2017    Quit Date: 6/5/17    Clinical Status of Patient: Outpatient    Length of Service: 30 minutes    Continuing Medication: yes  Patches    Other Medications: none     Target Symptoms: Withdrawal and medication side effects. The following were  rated moderate (3) to severe (4) on TCRS:  · Moderate (3): none  · Severe (4): none    Comments: Pt seen in office today. He continues to smoke 3 cigs/day. She does not buy anymore cigarettes but often smokes with her neighbor when he comes to visit.   Pt remains on tobacco cessation medication of  14 mg nicotine patch QD  No adverse effects  noted at this time.  Encouraged patient to explore ways in which she can socialize with her neighbor while not smoking. We discussed different sinereos Reviewed coping strategies/habitual behavior/relapse prevention with patient. Exhaled carbon monoxide level was 10 ppm per [employer]  ( non- smoker = 0-5 ppm .)  Will see pt back in office in 2 weeks.      Diagnosis: F17.200    Next Visit: 2 weeks   Sotyktu Pregnancy And Lactation Text: There is insufficient data to evaluate whether or not Sotyktu is safe to use during pregnancy.? ?It is not known if Sotyktu passes into breast milk and whether or not it is safe to use when breastfeeding.??

## 2023-06-27 ENCOUNTER — TELEPHONE (OUTPATIENT)
Dept: SMOKING CESSATION | Facility: CLINIC | Age: 83
End: 2023-06-27
Payer: MEDICARE

## 2023-06-27 NOTE — TELEPHONE ENCOUNTER
Smoking Cessation counselor contacted patient regarding no show appointment for intake visit, patient stated she would like to reschedule her appointment for 6/30/23 at 8:00 A.M. Counselor contacted  to reschedule appointment.     Sophia Farah RRT,MSW,LMSW,TTS  [phone]

## 2023-06-30 ENCOUNTER — CLINICAL SUPPORT (OUTPATIENT)
Dept: SMOKING CESSATION | Facility: CLINIC | Age: 83
End: 2023-06-30
Payer: COMMERCIAL

## 2023-06-30 DIAGNOSIS — F17.200 NICOTINE DEPENDENCE: Primary | ICD-10-CM

## 2023-06-30 PROCEDURE — 99404 PR PREVENT COUNSEL,INDIV,60 MIN: ICD-10-PCS | Mod: S$GLB,,,

## 2023-06-30 PROCEDURE — 99404 PREV MED CNSL INDIV APPRX 60: CPT | Mod: S$GLB,,,

## 2023-06-30 PROCEDURE — 99999 PR PBB SHADOW E&M-EST. PATIENT-LVL II: CPT | Mod: PBBFAC,,,

## 2023-06-30 PROCEDURE — 99999 PR PBB SHADOW E&M-EST. PATIENT-LVL II: ICD-10-PCS | Mod: PBBFAC,,,

## 2023-06-30 NOTE — PROGRESS NOTES
Patient presented to clinic for initial intake visit, name and date of birth verified as two patient identifiers.  Patient's FTND score of 5 is indicative of a high level of nicotine dependence, and her CESD score of 13 is perceived as no mental distress noted.  Patient reported she currently smokes 10-20 cpd.  Counselor discussed nicotine replacement options and packet 1 with patient.  Counselor outlined cues and triggers, differentiating between urge and habit, behavior modification, initiating a smoking journal, waiting 15 minutes prior to smoking, and engaging in positive outlets such as purging unwanted items and working on puzzles in lieu of smoking.  Patient will begin biweekly smoking cessation counseling sessions, and she will also begin NRT with 21 mg nicotine patch.  Prescription for 21 mg nicotine patches was not generated today, patient stated she has some patches at home and she would like to finish those first.  Counselor allotted time for questions, and she provided patient with her contact information.  Follow-up visit scheduled in two weeks.  Counselor will remain available should any further needs arise.

## 2023-07-14 ENCOUNTER — CLINICAL SUPPORT (OUTPATIENT)
Dept: SMOKING CESSATION | Facility: CLINIC | Age: 83
End: 2023-07-14
Payer: COMMERCIAL

## 2023-07-14 DIAGNOSIS — F17.200 NICOTINE DEPENDENCE: Primary | ICD-10-CM

## 2023-07-14 PROCEDURE — 99402 PR PREVENT COUNSEL,INDIV,30 MIN: ICD-10-PCS | Mod: S$GLB,,,

## 2023-07-14 PROCEDURE — 99999 PR PBB SHADOW E&M-EST. PATIENT-LVL II: ICD-10-PCS | Mod: PBBFAC,,,

## 2023-07-14 PROCEDURE — 99402 PREV MED CNSL INDIV APPRX 30: CPT | Mod: S$GLB,,,

## 2023-07-14 PROCEDURE — 99999 PR PBB SHADOW E&M-EST. PATIENT-LVL II: CPT | Mod: PBBFAC,,,

## 2023-07-14 RX ORDER — IBUPROFEN 200 MG
1 TABLET ORAL DAILY
Qty: 28 PATCH | Refills: 0 | Status: SHIPPED | OUTPATIENT
Start: 2023-07-14 | End: 2023-08-30 | Stop reason: DRUGHIGH

## 2023-07-14 NOTE — PROGRESS NOTES
Individual Follow-Up Form    7/14/2023    Quit Date: TBD    Clinical Status of Patient: Outpatient    Length of Service: 30 minutes    Continuing Medication: yes  Patches    Other Medications: None      Target Symptoms: Withdrawal and medication side effects. The following were  rated moderate (3) to severe (4) on TCRS:  Moderate (3): Crave and Desire   Severe (4): None     Comments: Counselor spoke with patient for telephonic visit, name and date of birth verified as two patient identifiers.   Patient stated she is unable to come into the clinic today because she is not feeling well.  Patient reported she is smoking less than 10 CPD, and he began using 21 mg nicotine patch without any negative side effects reported at this time.  Counselor congratulated patient on he progress thus far.  Counselor also discussed patient continuing to wait prior to smoking, working towards further behavior modification strategies, and attempting further rate reduction.  Follow-up visit scheduled in two weeks.  Counselor will remain available should any further needs arise.        Diagnosis: F17.200    Next Visit: 2 weeks

## 2023-07-28 ENCOUNTER — CLINICAL SUPPORT (OUTPATIENT)
Dept: SMOKING CESSATION | Facility: CLINIC | Age: 83
End: 2023-07-28
Payer: COMMERCIAL

## 2023-07-28 DIAGNOSIS — F17.200 NICOTINE DEPENDENCE: Primary | ICD-10-CM

## 2023-07-28 PROCEDURE — 99999 PR PBB SHADOW E&M-EST. PATIENT-LVL II: CPT | Mod: PBBFAC,,,

## 2023-07-28 PROCEDURE — 99999 PR PBB SHADOW E&M-EST. PATIENT-LVL II: ICD-10-PCS | Mod: PBBFAC,,,

## 2023-07-28 PROCEDURE — 99403 PR PREVENT COUNSEL,INDIV,45 MIN: ICD-10-PCS | Mod: S$GLB,,,

## 2023-07-28 PROCEDURE — 99403 PREV MED CNSL INDIV APPRX 45: CPT | Mod: S$GLB,,,

## 2023-07-28 NOTE — PROGRESS NOTES
Individual Follow-Up Form    7/28/2023    Quit Date: TBD    Clinical Status of Patient: Outpatient    Length of Service: 45 minutes    Continuing Medication: yes  Patches    Other Medications: None      Target Symptoms: Withdrawal and medication side effects. The following were  rated moderate (3) to severe (4) on TCRS:  Moderate (3): Crave and Desire  Severe (4): None     Comments: Patient presented to clinic for follow-up visit, name and date of birth verified as two patient identifiers.   Patient reported she is still smoking about 7 CPD, and she is still using 21 mg nicotine patch in conjunction without any negative side effects reported at this time.  Counselor congratulated patient on her progress thus far.  Counselor also reviewed packet 2 with patient outlining the benefits on one's health once she achieves tobacco free status.  Follow-up visit scheduled in two weeks.  Counselor will remain available should any further needs arise.    Diagnosis: F17.200    Next Visit: 2 weeks

## 2023-08-11 ENCOUNTER — CLINICAL SUPPORT (OUTPATIENT)
Dept: SMOKING CESSATION | Facility: CLINIC | Age: 83
End: 2023-08-11
Payer: COMMERCIAL

## 2023-08-11 DIAGNOSIS — F17.200 NICOTINE DEPENDENCE: Primary | ICD-10-CM

## 2023-08-11 PROCEDURE — 99403 PR PREVENT COUNSEL,INDIV,45 MIN: ICD-10-PCS | Mod: S$GLB,,,

## 2023-08-11 PROCEDURE — 99999 PR PBB SHADOW E&M-EST. PATIENT-LVL II: ICD-10-PCS | Mod: PBBFAC,,,

## 2023-08-11 PROCEDURE — 99403 PREV MED CNSL INDIV APPRX 45: CPT | Mod: S$GLB,,,

## 2023-08-11 PROCEDURE — 99999 PR PBB SHADOW E&M-EST. PATIENT-LVL II: CPT | Mod: PBBFAC,,,

## 2023-08-11 NOTE — PROGRESS NOTES
Individual Follow-Up Form    8/11/2023    Quit Date: TBD    Clinical Status of Patient: Outpatient    Length of Service: 45 minutes    Continuing Medication: yes  Patches    Other Medications: None      Target Symptoms: Withdrawal and medication side effects. The following were  rated moderate (3) to severe (4) on TCRS:  Moderate (3): Crave and Desire   Severe (4): None     Comments: Patient presented to clinic for follow-up visit, name and date of birth verified as two patient identifiers.   Patient reported she is still smoking about 4 CPD, and she is still using 21 mg nicotine patch without any negative side effects reported at this time. Patient also reported she is waiting longer to smoke her first cigarette of the day.   Counselor discussed packet 3 with patient outlining high risk situations and developing a plan for them, understanding urges and cravings, managing emotions, and breathing exercises and meditation techniques.  Follow-up visit scheduled in two weeks.  Counselor will remain available should any further needs arise.      Diagnosis: F17.200    Next Visit: 2 weeks

## 2023-08-28 ENCOUNTER — TELEPHONE (OUTPATIENT)
Dept: SMOKING CESSATION | Facility: CLINIC | Age: 83
End: 2023-08-28
Payer: MEDICARE

## 2023-08-28 NOTE — TELEPHONE ENCOUNTER
Smoking Cessation counselor attempted to contact patient regarding no show appointment for follow-up visit.  Counselor left a message with rescheduling information.      Sophia Farah RRT,MSW,LMSW,TTS  [phone]

## 2023-08-30 ENCOUNTER — CLINICAL SUPPORT (OUTPATIENT)
Dept: SMOKING CESSATION | Facility: CLINIC | Age: 83
End: 2023-08-30
Payer: COMMERCIAL

## 2023-08-30 DIAGNOSIS — F17.200 NICOTINE DEPENDENCE: Primary | ICD-10-CM

## 2023-08-30 PROCEDURE — 99999 PR PBB SHADOW E&M-EST. PATIENT-LVL I: CPT | Mod: PBBFAC,,,

## 2023-08-30 PROCEDURE — 99402 PREV MED CNSL INDIV APPRX 30: CPT | Mod: S$GLB,,,

## 2023-08-30 PROCEDURE — 99402 PR PREVENT COUNSEL,INDIV,30 MIN: ICD-10-PCS | Mod: S$GLB,,,

## 2023-08-30 PROCEDURE — 99999 PR PBB SHADOW E&M-EST. PATIENT-LVL I: ICD-10-PCS | Mod: PBBFAC,,,

## 2023-08-30 RX ORDER — IBUPROFEN 200 MG
1 TABLET ORAL DAILY
Qty: 28 PATCH | Refills: 0 | Status: SHIPPED | OUTPATIENT
Start: 2023-08-30 | End: 2023-10-02 | Stop reason: SDUPTHER

## 2023-08-30 NOTE — PROGRESS NOTES
Individual Follow-Up Form    8/30/2023    Quit Date: TBD    Clinical Status of Patient: Outpatient    Length of Service: 30 minutes    Continuing Medication: yes  Patches    Other Medications: None      Target Symptoms: Withdrawal and medication side effects. The following were  rated moderate (3) to severe (4) on TCRS:  Moderate (3): Crave and Desire  Severe (4): None     Comments: Counselor spoke with patient for telephonic visit, name and date of birth verified as two patient identifiers.   Patient stated he missed last scheduled visit because she was not feeling well.  Patient reported she is smoking 5 CPD, and she is still using 21 mg nicotine patch without any negative side effects reported at this time.  Counselor congratulated patient on her progress thus far.  Counselor also discussed patient continuing to wait prior to smoking, working towards further behavior modification strategies,attempting further rate reduction, and weaning 21 mg to 14 mg patch because she is maintaining a rate reduction of less than 10 cpd.  Patient verbalized understanding.  Follow-up visit scheduled in two weeks.  Counselor will remain available should any further needs arise.      Diagnosis: F17.200    Next Visit: 2 weeks   no breast tenderness L/no breast tenderness R/no breast lump L/no breast lump R

## 2023-09-18 ENCOUNTER — CLINICAL SUPPORT (OUTPATIENT)
Dept: SMOKING CESSATION | Facility: CLINIC | Age: 83
End: 2023-09-18
Payer: COMMERCIAL

## 2023-09-18 DIAGNOSIS — F17.200 NICOTINE DEPENDENCE: Primary | ICD-10-CM

## 2023-09-18 PROCEDURE — 99999 PR PBB SHADOW E&M-EST. PATIENT-LVL I: ICD-10-PCS | Mod: PBBFAC,,,

## 2023-09-18 PROCEDURE — 99999 PR PBB SHADOW E&M-EST. PATIENT-LVL I: CPT | Mod: PBBFAC,,,

## 2023-09-18 PROCEDURE — 99402 PR PREVENT COUNSEL,INDIV,30 MIN: ICD-10-PCS | Mod: S$GLB,,,

## 2023-09-18 PROCEDURE — 99402 PREV MED CNSL INDIV APPRX 30: CPT | Mod: S$GLB,,,

## 2023-09-18 NOTE — PROGRESS NOTES
Individual Follow-Up Form    9/18/2023    Quit Date: TBD    Clinical Status of Patient: Outpatient    Length of Service: 30 minutes    Continuing Medication: yes  Patches or Nicotine Lozenges    Other Medications: None      Target Symptoms: Withdrawal and medication side effects. The following were  rated moderate (3) to severe (4) on TCRS:  Moderate (3): Crave and Desire   Severe (4): None     Comments: Counselor spoke with patient for telephonic visit, name and date of birth verified as two patient identifiers.   Patient stated was unable to come into the clinic for last scheduled appointment due to an appointment conflict with her bone and joint physician.  Patient reported her smoking increased to 10 CPD, and she is began using 14 mg nicotine patch without any negative side effects reported at this time.  Counselor congratulated patient on her progress thus far.  Counselor also discussed patient working towards further rate reduction, waiting prior to smoking,and continuing to work towards further behavior modification strategies.  Follow-up visit scheduled in two weeks.  Counselor will remain available should any further needs arise.      Diagnosis: F17.200    Next Visit: 2 weeks

## 2023-09-20 ENCOUNTER — CLINICAL SUPPORT (OUTPATIENT)
Dept: SMOKING CESSATION | Facility: CLINIC | Age: 83
End: 2023-09-20
Payer: COMMERCIAL

## 2023-09-20 DIAGNOSIS — F17.200 NICOTINE DEPENDENCE: Primary | ICD-10-CM

## 2023-09-20 PROCEDURE — 99999 PR PBB SHADOW E&M-EST. PATIENT-LVL I: CPT | Mod: PBBFAC,,,

## 2023-09-20 PROCEDURE — 99999 PR PBB SHADOW E&M-EST. PATIENT-LVL I: ICD-10-PCS | Mod: PBBFAC,,,

## 2023-09-20 PROCEDURE — 99407 BEHAV CHNG SMOKING > 10 MIN: CPT | Mod: S$GLB,,,

## 2023-09-20 PROCEDURE — 99407 PR TOBACCO USE CESSATION INTENSIVE >10 MINUTES: ICD-10-PCS | Mod: S$GLB,,,

## 2023-10-02 ENCOUNTER — CLINICAL SUPPORT (OUTPATIENT)
Dept: SMOKING CESSATION | Facility: CLINIC | Age: 83
End: 2023-10-02
Payer: COMMERCIAL

## 2023-10-02 DIAGNOSIS — F17.200 NICOTINE DEPENDENCE: Primary | ICD-10-CM

## 2023-10-02 PROCEDURE — 99999 PR PBB SHADOW E&M-EST. PATIENT-LVL II: ICD-10-PCS | Mod: PBBFAC,,,

## 2023-10-02 PROCEDURE — 99403 PR PREVENT COUNSEL,INDIV,45 MIN: ICD-10-PCS | Mod: S$GLB,,,

## 2023-10-02 PROCEDURE — 99403 PREV MED CNSL INDIV APPRX 45: CPT | Mod: S$GLB,,,

## 2023-10-02 PROCEDURE — 99999 PR PBB SHADOW E&M-EST. PATIENT-LVL II: CPT | Mod: PBBFAC,,,

## 2023-10-02 RX ORDER — MICONAZOLE NITRATE 2 %
2 CREAM (GRAM) TOPICAL
Qty: 100 EACH | Refills: 0 | Status: SHIPPED | OUTPATIENT
Start: 2023-10-02 | End: 2023-11-02

## 2023-10-02 RX ORDER — IBUPROFEN 200 MG
1 TABLET ORAL DAILY
Qty: 28 PATCH | Refills: 0 | Status: SHIPPED | OUTPATIENT
Start: 2023-10-02 | End: 2023-11-02 | Stop reason: SDUPTHER

## 2023-10-02 NOTE — PROGRESS NOTES
Individual Follow-Up Form    10/2/2023    Quit Date: TBD    Clinical Status of Patient: Outpatient    Length of Service: 45 minutes    Continuing Medication: yes  Patches    Other Medications: None      Target Symptoms: Withdrawal and medication side effects. The following were  rated moderate (3) to severe (4) on TCRS:  Moderate (3): Crave and Desire   Severe (4): None     Comments: Patient presented to clinic for follow-up visit, name and date of birth verified as two patient identifiers.  Patient reported she is down to 3 cpd, and she is still using 14 mg nicotine patch without any negative side effects reported at this time.  Patient also reported she is out of cigarettes and she is not planning on buying anymore cigarettes.  Counselor congratulated patient on her progress thus far.  Counselor also discussed patient implementing new activities such as going to the gym in lieu of smoking,continuing to work towards further behavior modification strategies, and patient will also resume usage of 2 mg nicotine gum.  Follow-up visit scheduled in two weeks.  Counselor will remain available should any further needs arise.      Diagnosis: F17.200    Next Visit: 2 weeks

## 2023-10-17 ENCOUNTER — CLINICAL SUPPORT (OUTPATIENT)
Dept: SMOKING CESSATION | Facility: CLINIC | Age: 83
End: 2023-10-17
Payer: COMMERCIAL

## 2023-10-17 DIAGNOSIS — F17.200 NICOTINE DEPENDENCE: Primary | ICD-10-CM

## 2023-10-17 PROCEDURE — 99999 PR PBB SHADOW E&M-EST. PATIENT-LVL I: ICD-10-PCS | Mod: PBBFAC,,,

## 2023-10-17 PROCEDURE — 99404 PR PREVENT COUNSEL,INDIV,60 MIN: ICD-10-PCS | Mod: S$GLB,,,

## 2023-10-17 PROCEDURE — 99404 PREV MED CNSL INDIV APPRX 60: CPT | Mod: S$GLB,,,

## 2023-10-17 PROCEDURE — 99999 PR PBB SHADOW E&M-EST. PATIENT-LVL I: CPT | Mod: PBBFAC,,,

## 2023-10-17 NOTE — PROGRESS NOTES
Individual Follow-Up Form    10/17/2023    Quit Date: TBD    Clinical Status of Patient: Outpatient    Length of Service: 60 minutes    Continuing Medication: yes  Patches or Nicotine gum    Other Medications: None      Target Symptoms: Withdrawal and medication side effects. The following were  rated moderate (3) to severe (4) on TCRS:  Moderate (3): Crave and Desire   Severe (4): None     Comments: Patient presented to clinic for follow-up visit, name and date of birth verified as two patient identifiers.  Patient reported she is smoking 3-4 cpd, and she is still using 14 mg nicotine patch without any negative side effects reported at this time.  Patient also reported she was unsuccessful with not purchasing anymore cigarettes.  Patient also state she attributes most of her smoking to boredom.  Counselor congratulated patient on her progress thus far.  Counselor reiterated implementing new activities such as going to the gym,attending local senior center activities in lieu of smoking,working on puzzles,continuing to work towards further behavior modification strategies, and patient resuming usage of 2 mg nicotine gum.  Follow-up visit scheduled in two weeks.  Counselor will remain available should any further needs arise.      Diagnosis: F17.200    Next Visit: 2 weeks

## 2023-11-02 ENCOUNTER — CLINICAL SUPPORT (OUTPATIENT)
Dept: SMOKING CESSATION | Facility: CLINIC | Age: 83
End: 2023-11-02
Payer: COMMERCIAL

## 2023-11-02 DIAGNOSIS — F17.200 NICOTINE DEPENDENCE: Primary | ICD-10-CM

## 2023-11-02 PROCEDURE — 99999 PR PBB SHADOW E&M-EST. PATIENT-LVL I: ICD-10-PCS | Mod: PBBFAC,,,

## 2023-11-02 PROCEDURE — 99999 PR PBB SHADOW E&M-EST. PATIENT-LVL I: CPT | Mod: PBBFAC,,,

## 2023-11-02 PROCEDURE — 99402 PR PREVENT COUNSEL,INDIV,30 MIN: ICD-10-PCS | Mod: S$GLB,,,

## 2023-11-02 PROCEDURE — 99402 PREV MED CNSL INDIV APPRX 30: CPT | Mod: S$GLB,,,

## 2023-11-02 RX ORDER — IBUPROFEN 200 MG
1 TABLET ORAL DAILY
Qty: 28 PATCH | Refills: 0 | Status: SHIPPED | OUTPATIENT
Start: 2023-11-02 | End: 2023-12-01

## 2023-11-02 RX ORDER — DM/P-EPHED/ACETAMINOPH/DOXYLAM 30-7.5/3
2 LIQUID (ML) ORAL
Qty: 144 LOZENGE | Refills: 0 | Status: SHIPPED | OUTPATIENT
Start: 2023-11-02

## 2023-11-02 NOTE — PROGRESS NOTES
Individual Follow-Up Form    11/2/2023    Quit Date: TBD    Clinical Status of Patient: Outpatient    Length of Service: 30 minutes    Continuing Medication: yes  Patches or Nicotine gum    Other Medications: None     Target Symptoms: Withdrawal and medication side effects. The following were  rated moderate (3) to severe (4) on TCRS:  Moderate (3): Crave and Desire   Severe (4): None     Comments: Counselor spoke with patient for telephonic visit, name and date of birth verified as two patient identifiers.   Patient stated was unable to come into the clinic for last scheduled appointment due to transportation issues.  Patient reported she is smoking 4 CPD, and she is began using 14 mg nicotine patch without any negative side effects reported at this time. Patient also reported nicotine gum is becoming stuck to her dentures, and she is interested in starting 2 mg nicotine lozenges instead.   Counselor congratulated patient on her progress thus far. Counselor also discussed patient working towards further rate reduction, prolonging smoking as long as she can, continuing to work towards further behavior modification strategies.  Counselor also discussed proper medication usage and side effects for 2 mg nicotine lozenges, and patient verbalized understanding.  Counselor will discontinue 2 mg nicotine gum and she will place an order for 2 mg nicotine lozenges.  Follow-up visit scheduled in two weeks.  Counselor will remain available should any further needs arise.      Diagnosis: F17.200    Next Visit: 2 weeks

## 2023-11-30 ENCOUNTER — CLINICAL SUPPORT (OUTPATIENT)
Dept: SMOKING CESSATION | Facility: CLINIC | Age: 83
End: 2023-11-30
Payer: COMMERCIAL

## 2023-11-30 DIAGNOSIS — F17.200 NICOTINE DEPENDENCE: Primary | ICD-10-CM

## 2023-11-30 PROCEDURE — 99999 PR PBB SHADOW E&M-EST. PATIENT-LVL I: ICD-10-PCS | Mod: PBBFAC,,,

## 2023-11-30 PROCEDURE — 99402 PREV MED CNSL INDIV APPRX 30: CPT | Mod: S$GLB,,,

## 2023-11-30 PROCEDURE — 99999 PR PBB SHADOW E&M-EST. PATIENT-LVL I: CPT | Mod: PBBFAC,,,

## 2023-11-30 PROCEDURE — 99402 PR PREVENT COUNSEL,INDIV,30 MIN: ICD-10-PCS | Mod: S$GLB,,,

## 2023-11-30 NOTE — PROGRESS NOTES
Individual Follow-Up Form    11/30/2023    Quit Date: TBD    Clinical Status of Patient: Outpatient    Length of Service: 30 minutes    Continuing Medication: yes  Patches or Nicotine Lozenges    Other Medications: None      Target Symptoms: Withdrawal and medication side effects. The following were  rated moderate (3) to severe (4) on TCRS:  Moderate (3): Crave and Desire   Severe (4): None     Comments: Counselor spoke with patient for telephonic visit, name and date of birth verified as two patient identifiers. Patient reported she remains smoking 4 CPD, and she is still using 14 mg nicotine patch in conjunction with 2 mg nicotine lozenges without any negative side effects reported at this time.  Counselor congratulated patient on her progress thus far. Counselor also discussed patient further behavior modification strategies and electing a quit date.  Follow-up visit scheduled in two weeks.  Counselor will remain available should any further needs arise.       Diagnosis: F17.200    Next Visit: 2 weeks

## 2023-12-12 ENCOUNTER — TELEPHONE (OUTPATIENT)
Dept: SMOKING CESSATION | Facility: CLINIC | Age: 83
End: 2023-12-12
Payer: MEDICARE

## 2023-12-12 NOTE — TELEPHONE ENCOUNTER
1st attempt call regarding smoking cessation 6 month telephone follow up for quit 2 episode. PT voicemail box not set-up.

## 2023-12-14 ENCOUNTER — CLINICAL SUPPORT (OUTPATIENT)
Dept: SMOKING CESSATION | Facility: CLINIC | Age: 83
End: 2023-12-14
Payer: COMMERCIAL

## 2023-12-14 DIAGNOSIS — F17.200 NICOTINE DEPENDENCE: Primary | ICD-10-CM

## 2023-12-14 PROCEDURE — 99402 PR PREVENT COUNSEL,INDIV,30 MIN: ICD-10-PCS | Mod: S$GLB,,,

## 2023-12-14 PROCEDURE — 99999 PR PBB SHADOW E&M-EST. PATIENT-LVL I: CPT | Mod: PBBFAC,,,

## 2023-12-14 PROCEDURE — 99402 PREV MED CNSL INDIV APPRX 30: CPT | Mod: S$GLB,,,

## 2023-12-14 PROCEDURE — 99999 PR PBB SHADOW E&M-EST. PATIENT-LVL I: ICD-10-PCS | Mod: PBBFAC,,,

## 2023-12-14 NOTE — PROGRESS NOTES
Individual Follow-Up Form    12/14/2023    Quit Date: TBD    Clinical Status of Patient: Outpatient    Length of Service: 30 minutes    Continuing Medication: yes  Patches or Nicotine Lozenges    Other Medications: None      Target Symptoms: Withdrawal and medication side effects. The following were  rated moderate (3) to severe (4) on TCRS:  Moderate (3): Crave and Desire   Severe (4): None     Comments: Counselor spoke with patient for telephonic visit, name and date of birth verified as two patient identifiers. Patient reported she remains smoking 4-5 CPD, and she is still using 14 mg nicotine patch in conjunction with 2 mg nicotine lozenges without any negative side effects reported at this time.  Counselor congratulated patient on her progress thus far. Counselor also discussed patient incorporating more lozenge usage to aid with further rate reduction, and she also reiterated patient working on further behavior modification strategies and selecting a quit date.  Follow-up visit scheduled in two weeks.  Counselor will remain available should any further needs arise.        Diagnosis: F17.200    Next Visit: 2 weeks

## 2024-01-12 ENCOUNTER — CLINICAL SUPPORT (OUTPATIENT)
Dept: SMOKING CESSATION | Facility: CLINIC | Age: 84
End: 2024-01-12
Payer: COMMERCIAL

## 2024-01-12 DIAGNOSIS — F17.200 NICOTINE DEPENDENCE: Primary | ICD-10-CM

## 2024-01-12 PROCEDURE — 99999 PR PBB SHADOW E&M-EST. PATIENT-LVL II: CPT | Mod: PBBFAC,,,

## 2024-01-12 PROCEDURE — 99402 PREV MED CNSL INDIV APPRX 30: CPT | Mod: S$GLB,,,

## 2024-01-13 NOTE — PROGRESS NOTES
Individual Follow-Up Form    1/12/2024    Quit Date: TBD    Clinical Status of Patient: Outpatient    Length of Service: 30 minutes    Continuing Medication: yes  Patches    Other Medications: None      Target Symptoms: Withdrawal and medication side effects. The following were  rated moderate (3) to severe (4) on TCRS:  Moderate (3): Crave and Desire   Severe (4): None     Comments: Counselor spoke with patient for telephonic visit, name and date of birth verified as two patient identifiers. Patient reported she is smoking 2 CPD, and she is still using 14 mg nicotine patch  without any negative side effects reported at this time.  Patient also reported she is no longer using in 2 mg nicotine lozenges.  Counselor congratulated patient on her progress thus far. Counselor also discussed patient completing a 24 hour quit challenge prior to next scheduled visit, patient working on further behavior modification strategies, and selecting a quit date.  Follow-up visit scheduled in two weeks. Counselor will remain available should any further needs arise.         Diagnosis: F17.200    Next Visit: 2 weeks

## 2024-01-24 ENCOUNTER — TELEPHONE (OUTPATIENT)
Dept: SMOKING CESSATION | Facility: CLINIC | Age: 84
End: 2024-01-24
Payer: MEDICARE

## 2024-01-24 NOTE — TELEPHONE ENCOUNTER
Counselor attempted to contact patient for scheduled telephonic visit. Counselor lwas unable to leave a message due to an un established voicemail.      Sophia Farah RRT, MSW, LMSW, [facility]  Certified Professional [employer]- Tobacco Treatment  [phone]

## 2024-01-25 ENCOUNTER — TELEPHONE (OUTPATIENT)
Dept: SMOKING CESSATION | Facility: CLINIC | Age: 84
End: 2024-01-25
Payer: MEDICARE

## 2024-01-25 NOTE — TELEPHONE ENCOUNTER
Smoking Cessation counselor attempted to contact patient regarding no show appointment for follow-up visit.  Counselor was unable to leave a message with rescheduling information due too an unestablished voicemail.     Sophia Farah RRT,MSW,LMSW, [facility]  Certified Professional [employer]- Tobacco Treatment  [phone]

## 2024-01-30 ENCOUNTER — CLINICAL SUPPORT (OUTPATIENT)
Dept: SMOKING CESSATION | Facility: CLINIC | Age: 84
End: 2024-01-30
Payer: COMMERCIAL

## 2024-01-30 DIAGNOSIS — F17.200 NICOTINE DEPENDENCE: Primary | ICD-10-CM

## 2024-01-30 PROCEDURE — 99999 PR PBB SHADOW E&M-EST. PATIENT-LVL I: CPT | Mod: PBBFAC,,,

## 2024-01-30 PROCEDURE — 99402 PREV MED CNSL INDIV APPRX 30: CPT | Mod: S$GLB,,,

## 2024-01-30 NOTE — PROGRESS NOTES
Individual Follow-Up Form    1/30/2024    Quit Date: TBD    Clinical Status of Patient: Outpatient    Length of Service: 30 minutes    Continuing Medication: yes  Patches or Nicotine gum    Other Medications: None      Target Symptoms: Withdrawal and medication side effects. The following were  rated moderate (3) to severe (4) on TCRS:  Moderate (3): Crave and Desire   Severe (4): None     Comments: Counselor spoke with patient for telephonic visit, name and date of birth verified as two patient identifiers. Patient reported she remains smoking 2 CPD, and she is still using 14 mg nicotine patch in conjunction with 2 mg nicotine gum without any negative side effects reported at this time.  Patient also reported she was unable to complete a 24 hour challenge, but she will complete one prior to her next scheduled visit.  Counselor congratulated patient on her progress thus far. Counselor also discussed patient working on further behavior modification strategies and incorporating more nicotine gum usage in lieu of smoking. Follow-up visit scheduled in two weeks. Counselor will remain available should any further needs arise.          Diagnosis: F17.200    Next Visit: 2 weeks

## 2024-02-19 ENCOUNTER — TELEPHONE (OUTPATIENT)
Dept: SMOKING CESSATION | Facility: CLINIC | Age: 84
End: 2024-02-19
Payer: MEDICARE

## 2024-02-19 NOTE — TELEPHONE ENCOUNTER
Smoking Cessation counselor attempted to contact patient regarding no show appointment for follow-up visit.  Counselor was unable to leave a message with rescheduling information due to an unestablished voicemail.      Sophia Farah RRT,MSW,LMSW, [facility]  Certified Professional [employer]- Tobacco Treatment  [phone]

## 2024-02-22 ENCOUNTER — CLINICAL SUPPORT (OUTPATIENT)
Dept: SMOKING CESSATION | Facility: CLINIC | Age: 84
End: 2024-02-22
Payer: COMMERCIAL

## 2024-02-22 DIAGNOSIS — F17.200 NICOTINE DEPENDENCE: Primary | ICD-10-CM

## 2024-02-22 PROCEDURE — 99999 PR PBB SHADOW E&M-EST. PATIENT-LVL I: CPT | Mod: PBBFAC,,,

## 2024-02-22 PROCEDURE — 99402 PREV MED CNSL INDIV APPRX 30: CPT | Mod: S$GLB,,,

## 2024-02-22 NOTE — PROGRESS NOTES
Individual Follow-Up Form    2/22/2024    Quit Date: TBD    Clinical Status of Patient: Outpatient    Length of Service: 30 minutes    Continuing Medication: yes  Patches or Nicotine gum    Other Medications: None      Target Symptoms: Withdrawal and medication side effects. The following were  rated moderate (3) to severe (4) on TCRS:  Moderate (3): Crave and Desire   Severe (4): None     Comments: Counselor spoke with patient for telephonic visit, name and date of birth verified as two patient identifiers. Patient reported she remains smoking 2 CPD.   Patient also reported she is still using 14 mg nicotine patch in conjunction with 2 mg nicotine gum without any negative side effects reported at this time.  Patient also stated she completed a 24 hour challenge, and she went two days without smoking.  Counselor congratulated patient on her progress thus far. Counselor also discussed patient solidifying a quit date, working on further behavior modification strategies, and incorporating more nicotine gum usage in lieu of smoking. Follow-up visit scheduled in two weeks. Counselor will remain available should any further needs arise.           Diagnosis: F17.200    Next Visit: 2 weeks

## 2024-03-06 ENCOUNTER — TELEPHONE (OUTPATIENT)
Dept: SMOKING CESSATION | Facility: CLINIC | Age: 84
End: 2024-03-06
Payer: MEDICARE

## 2024-03-06 NOTE — TELEPHONE ENCOUNTER
Smoking Cessation counselor attempted to contact patient regarding no show appointment.  Counselor was unable to leave a message with rescheduling information due to an unestablished voicemail.     Sophia Farah RRT,MSW,LMSW, [facility]   Certified Professional [employer]- Tobacco Treatment  [phone]

## 2024-03-12 ENCOUNTER — TELEPHONE (OUTPATIENT)
Dept: SMOKING CESSATION | Facility: CLINIC | Age: 84
End: 2024-03-12
Payer: MEDICARE

## 2024-03-12 NOTE — TELEPHONE ENCOUNTER
Smoking Cessation counselor attempted to contact patient regarding no show appointment.  Counselor was unable to leave a message with rescheduling information due to an unestablished voicemail.      Sophia Farah RRT,MSW,LMSW, [facility]   Certified Professional [employer]- Tobacco Treatment  [phone]

## 2024-03-14 ENCOUNTER — TELEPHONE (OUTPATIENT)
Dept: SMOKING CESSATION | Facility: CLINIC | Age: 84
End: 2024-03-14
Payer: MEDICARE

## 2024-03-14 NOTE — TELEPHONE ENCOUNTER
Smoking Cessation counselor attempted to contact patient regarding no show appointment.  Counselor was unable to leave a message with rescheduling information due to an unestablished voicemail.      Sophia Farah RRT,MSW,LMSW, [facility]   Certified Professional [employer]- Tobacco Treatment  [phone]

## 2024-06-27 ENCOUNTER — TELEPHONE (OUTPATIENT)
Dept: SMOKING CESSATION | Facility: CLINIC | Age: 84
End: 2024-06-27
Payer: MEDICARE

## 2025-04-14 DIAGNOSIS — L03.039 PARONYCHIA OF GREAT TOE: Primary | ICD-10-CM

## 2025-04-17 ENCOUNTER — OFFICE VISIT (OUTPATIENT)
Dept: PODIATRY | Facility: CLINIC | Age: 85
End: 2025-04-17
Payer: MEDICARE

## 2025-04-17 VITALS — WEIGHT: 104.06 LBS | BODY MASS INDEX: 19.15 KG/M2 | HEIGHT: 62 IN

## 2025-04-17 DIAGNOSIS — M79.674 PAIN OF TOE OF RIGHT FOOT: ICD-10-CM

## 2025-04-17 DIAGNOSIS — L03.039 PARONYCHIA OF GREAT TOE: ICD-10-CM

## 2025-04-17 DIAGNOSIS — E11.49 TYPE II DIABETES MELLITUS WITH NEUROLOGICAL MANIFESTATIONS: Primary | ICD-10-CM

## 2025-04-17 PROCEDURE — 1125F AMNT PAIN NOTED PAIN PRSNT: CPT | Mod: CPTII,S$GLB,, | Performed by: PODIATRIST

## 2025-04-17 PROCEDURE — 1101F PT FALLS ASSESS-DOCD LE1/YR: CPT | Mod: CPTII,S$GLB,, | Performed by: PODIATRIST

## 2025-04-17 PROCEDURE — 99999 PR PBB SHADOW E&M-EST. PATIENT-LVL II: CPT | Mod: PBBFAC,,, | Performed by: PODIATRIST

## 2025-04-17 PROCEDURE — 3288F FALL RISK ASSESSMENT DOCD: CPT | Mod: CPTII,S$GLB,, | Performed by: PODIATRIST

## 2025-04-17 PROCEDURE — 99204 OFFICE O/P NEW MOD 45 MIN: CPT | Mod: S$GLB,,, | Performed by: PODIATRIST

## 2025-04-22 ENCOUNTER — HOSPITAL ENCOUNTER (OUTPATIENT)
Dept: CARDIOLOGY | Facility: HOSPITAL | Age: 85
Discharge: HOME | End: 2025-04-22
Attending: PODIATRIST
Payer: MEDICARE

## 2025-04-22 DIAGNOSIS — M79.674 PAIN OF TOE OF RIGHT FOOT: ICD-10-CM

## 2025-04-22 LAB
LEFT ABI: 1.28
LEFT ARM BP: 128 MMHG
LEFT DORSALIS PEDIS: 164 MMHG
LEFT POSTERIOR TIBIAL: 159 MMHG
LEFT TBI: 0.7
LEFT TOE PRESSURE: 89 MMHG
RIGHT ABI: 1.3
RIGHT ARM BP: 126 MMHG
RIGHT DORSALIS PEDIS: 150 MMHG
RIGHT POSTERIOR TIBIAL: 167 MMHG
RIGHT TBI: 0.52
RIGHT TOE PRESSURE: 67 MMHG

## 2025-04-22 PROCEDURE — 93922 UPR/L XTREMITY ART 2 LEVELS: CPT

## 2025-04-22 PROCEDURE — 93922 UPR/L XTREMITY ART 2 LEVELS: CPT | Mod: 26,,, | Performed by: INTERNAL MEDICINE

## 2025-04-24 ENCOUNTER — TELEPHONE (OUTPATIENT)
Dept: PODIATRY | Facility: CLINIC | Age: 85
End: 2025-04-24
Payer: MEDICARE

## 2025-04-24 DIAGNOSIS — L03.039 PARONYCHIA OF GREAT TOE: Primary | ICD-10-CM

## 2025-04-24 DIAGNOSIS — M79.674 PAIN OF TOE OF RIGHT FOOT: ICD-10-CM

## 2025-04-30 ENCOUNTER — TELEPHONE (OUTPATIENT)
Dept: PODIATRY | Facility: CLINIC | Age: 85
End: 2025-04-30
Payer: MEDICARE

## 2025-04-30 NOTE — TELEPHONE ENCOUNTER
Pt states she is still experiencing occasional pain and tangling in her feet and would like to know what is the next step

## 2025-04-30 NOTE — TELEPHONE ENCOUNTER
----- Message from Farhana sent at 4/30/2025  3:51 PM CDT -----  Name of Who is Calling:KRISHNA QUARLES [[id number]]What is the request in detail:pt is requesting a call back regarding for her feet. Pt stated she needs to know what's going on with her feet and if she needs to anymore testing. Please contact to further discuss and advise.  Can the clinic reply by [facility]:call What Number to Call Back if not in [facility]:.[phone]

## 2025-06-13 ENCOUNTER — TELEPHONE (OUTPATIENT)
Dept: VASCULAR SURGERY | Facility: CLINIC | Age: 85
End: 2025-06-13
Payer: MEDICARE

## 2025-06-13 NOTE — TELEPHONE ENCOUNTER
Called pt. Regarding referral received for vascular. Called pt. and appt. with  scheduled. Pt. verbalized understanding and aware of location of appt. I gave her our office number if she has any questions prior.

## 2025-06-16 ENCOUNTER — INITIAL CONSULT (OUTPATIENT)
Dept: VASCULAR SURGERY | Facility: CLINIC | Age: 85
End: 2025-06-16
Payer: MEDICARE

## 2025-06-16 VITALS
DIASTOLIC BLOOD PRESSURE: 72 MMHG | WEIGHT: 80 LBS | HEART RATE: 106 BPM | HEIGHT: 62 IN | BODY MASS INDEX: 14.72 KG/M2 | SYSTOLIC BLOOD PRESSURE: 116 MMHG

## 2025-06-16 DIAGNOSIS — L03.039 PARONYCHIA OF GREAT TOE: Primary | ICD-10-CM

## 2025-06-16 DIAGNOSIS — L03.039 PARONYCHIA OF GREAT TOE: ICD-10-CM

## 2025-06-16 DIAGNOSIS — M79.674 PAIN OF TOE OF RIGHT FOOT: ICD-10-CM

## 2025-06-16 PROCEDURE — 99999 PR PBB SHADOW E&M-EST. PATIENT-LVL III: CPT | Mod: PBBFAC,,, | Performed by: SURGERY

## 2025-06-16 PROCEDURE — 99205 OFFICE O/P NEW HI 60 MIN: CPT | Mod: S$GLB,,, | Performed by: SURGERY

## 2025-06-16 RX ORDER — ASPIRIN 81 MG/1
81 TABLET ORAL DAILY
Qty: 90 TABLET | Refills: 3 | Status: SHIPPED | OUTPATIENT
Start: 2025-06-16 | End: 2026-06-16

## 2025-06-16 NOTE — PROGRESS NOTES
"Kj Castillo MD, PhD  [facility]   06/16/2025    HPI:  Lauryn Pastrana is a 84 y.o. female with a history of arterial disease, who was referred by Dr. Saxena.      Patient complains of occasional right greater than left bilateral foot pains.  Occurs mainly at night or when she is walking.  No clear pattern.  Denies claudication symptoms.  No wounds.  She had a previous toe wound with drainage that was treated with the antibiotics in his healed completely.  Relevant cardiac hx:  History of diabetes well controlled  Tobacco use:  Currently smoking trying to quit 1 pack lasts her a day and a half    Problem List[1]  Past Medical History:   Diagnosis Date    Arthritis     Diabetes mellitus     Diabetes mellitus, type 2     Hypertension     Thyroid activity decreased      Past Surgical History:   Procedure Laterality Date    HYSTERECTOMY       No family history on file.  Social History[2]  Current Medications[3]    REVIEW OF SYSTEMS:  ROS       VITALS:  Vitals:    06/16/25 0911   BP: 116/72   BP Location: Right arm   Patient Position: Sitting   Pulse: 106   Weight: 36.3 kg (80 lb)   Height: 5' 2" (1.575 m)        PHYSICAL EXAM:   Physical Exam         LAB RESULTS:  No results found for: "CBC"  Lab Results   Component Value Date    LABPROT 11.9 (H) 02/25/2025    INR 1.1 02/25/2025     Lab Results   Component Value Date     02/27/2025    K 3.8 02/27/2025     07/04/2012    CO2 26 02/27/2025    GLU 85 01/10/2023    BUN 24.8 02/27/2025    CREATININE 0.70 02/27/2025    CALCIUM 8.4 02/27/2025    ANIONGAP 4 (L) 02/27/2025    EGFRNONAA >60 07/04/2012     Lab Results   Component Value Date    WBC 5.03 07/04/2012    RBC 4.50 07/04/2012    HGB 13.4 07/04/2012    HCT 39.2 07/04/2012    MCV 87.1 07/04/2012    MCH 29.8 07/04/2012    MCHC 34.2 07/04/2012    RDW 13.4 07/04/2012     07/04/2012    MPV 9.9 07/04/2012    GRAN 2.7 07/04/2012    GRAN 54.1 07/04/2012    LYMPH 34.4 07/04/2012    " LYMPH 1.7 07/04/2012    MONO 9.3 (H) 07/04/2012    MONO 0.5 07/04/2012    EOS 0.1 07/04/2012    BASO 0.0 07/04/2012    EOSINOPHIL 1.8 07/04/2012    BASOPHIL 0.4 07/04/2012     .  Lab Results   Component Value Date    HGBA1C 5.6 01/14/2025       IMAGING:  All pertinent imaging has been reviewed and interpreted independently.      IMP/PLAN:  Lauryn Pastrana is a 84 y.o. female with peripheral arterial disease asymptomatic.  Normal ABIs.  Toe pressures are slightly diminished but do not indicate need for treatment at this time.      Plan:      Return to clinic in 1 year with repeat noninvasive studies surveillance  Patient counseled on smoking cessation  Start baby aspirin therapy daily, prescription order        I spent a total of 30 minutes on the day of the visit.This includes face to face time and non-face to face time preparing to see the patient (eg, review of tests), obtaining and/or reviewing separately obtained history, documenting clinical information in the electronic or other health record, independently interpreting results and communicating results to the patient/family/caregiver, or care coordinator.      Kj Castillo MD, PhD  Vascular and Endovascular Surgery         [1] There is no problem list on file for this patient.  [2]   Social History  Socioeconomic History    Marital status: Single   Tobacco Use    Smoking status: Every Day     Current packs/day: 0.25     Average packs/day: 0.9 packs/day for 70.9 years (60.8 ttl pk-yrs)     Types: Cigarettes     Start date: 1955     Last attempt to quit: 6/24/2017    Smokeless tobacco: Never    Tobacco comments:     Patient is currently enrolled in Smoking Cessation.  Comprehensive smoking hx was updated on 8/30/23 by      CAMILA Farah RRT,MSW,LMSW,TTS   Substance and Sexual Activity    Alcohol use: Never    Drug use: Never     Social Drivers of Health     Financial Resource Strain: Low Risk  (1/3/2024)    Received from [facility]    Overall  Financial Resource Strain (CARDIA)     Difficulty of Paying Living Expenses: Not hard at all   Food Insecurity: No Food Insecurity (2/26/2025)    Received from [facility]    Hunger Vital Sign     Worried About Running Out of Food in the Last Year: Never true     Ran Out of Food in the Last Year: Never true   Transportation Needs: No Transportation Needs (2/26/2025)    Received from [facility]    PRAPARE - Transportation     Lack of Transportation (Medical): No     Lack of Transportation (Non-Medical): No   Physical Activity: Inactive (1/3/2024)    Received from [facility]    Exercise Vital Sign     Days of Exercise per Week: 0 days     Minutes of Exercise per Session: 0 min   Stress: No Stress Concern Present (1/3/2024)    Received from [facility]    [employer] [place] [employer] - Occupational Stress Questionnaire     Feeling of Stress : Not at all   Housing Stability: Low Risk  (2/26/2025)    Received from [facility]    Housing Stability Vital Sign     Unable to Pay for Housing in the Last Year: No     Number of Times Moved in the Last Year: 1     Homeless in the Last Year: No   [3]   Current Outpatient Medications:     amlodipine (NORVASC) 10 MG tablet, Take 10 mg by mouth once daily., Disp: , Rfl:     benazepril (LOTENSIN) 40 MG tablet, Take 40 mg by mouth once daily., Disp: , Rfl:     levothyroxine (SYNTHROID) 50 MCG tablet, Take 50 mcg by mouth once daily., Disp: , Rfl:     meloxicam (MOBIC) 7.5 MG tablet, Take 1 tablet (7.5 mg total) by mouth once daily., Disp: 12 tablet, Rfl: 0    metformin (GLUCOPHAGE) 850 MG tablet, Take 850 mg by mouth 2 (two) times daily with meals., Disp: , Rfl:     naproxen (NAPROSYN) 250 MG tablet, Take 500 mg by mouth 2 (two) times daily with meals., Disp: , Rfl:     nicotine polacrilex 2 MG Lozg, Take 1 lozenge (2 mg total) by mouth as needed (Take 1 each (2 mg total) by mouth as needed (Use 1 piece as needed every 3-4 hours in the place of a cigarette. Maximum of 5  pieces per day.).)., Disp: 144 lozenge, Rfl: 0    simvastatin (ZOCOR) 40 MG tablet, Take 40 mg by mouth every evening., Disp: , Rfl:     traMADoL (ULTRAM) 50 mg tablet, Take 50 mg by mouth every 6 (six) hours as needed for Pain., Disp: , Rfl:

## 2025-08-15 ENCOUNTER — TELEPHONE (OUTPATIENT)
Dept: PODIATRY | Facility: CLINIC | Age: 85
End: 2025-08-15
Payer: MEDICARE

## 2025-08-18 ENCOUNTER — OFFICE VISIT (OUTPATIENT)
Dept: PODIATRY | Facility: CLINIC | Age: 85
End: 2025-08-18
Payer: MEDICARE

## 2025-08-18 VITALS — BODY MASS INDEX: 14.72 KG/M2 | WEIGHT: 80 LBS | HEIGHT: 62 IN

## 2025-08-18 DIAGNOSIS — M79.674 PAIN OF TOE OF RIGHT FOOT: ICD-10-CM

## 2025-08-18 DIAGNOSIS — E11.49 TYPE II DIABETES MELLITUS WITH NEUROLOGICAL MANIFESTATIONS: Primary | ICD-10-CM

## 2025-08-18 PROCEDURE — 99999 PR PBB SHADOW E&M-EST. PATIENT-LVL III: CPT | Mod: PBBFAC,,, | Performed by: PODIATRIST

## 2025-08-18 PROCEDURE — 1159F MED LIST DOCD IN RCRD: CPT | Mod: CPTII,S$GLB,, | Performed by: PODIATRIST

## 2025-08-18 PROCEDURE — 1101F PT FALLS ASSESS-DOCD LE1/YR: CPT | Mod: CPTII,S$GLB,, | Performed by: PODIATRIST

## 2025-08-18 PROCEDURE — 3288F FALL RISK ASSESSMENT DOCD: CPT | Mod: CPTII,S$GLB,, | Performed by: PODIATRIST

## 2025-08-18 PROCEDURE — 99213 OFFICE O/P EST LOW 20 MIN: CPT | Mod: S$GLB,,, | Performed by: PODIATRIST

## 2025-08-18 PROCEDURE — 1126F AMNT PAIN NOTED NONE PRSNT: CPT | Mod: CPTII,S$GLB,, | Performed by: PODIATRIST

## 2025-08-18 RX ORDER — DICLOFENAC SODIUM 10 MG/G
2 GEL TOPICAL 4 TIMES DAILY
Qty: 450 G | Refills: 3 | Status: SHIPPED | OUTPATIENT
Start: 2025-08-18